# Patient Record
Sex: FEMALE | Race: BLACK OR AFRICAN AMERICAN | NOT HISPANIC OR LATINO | Employment: PART TIME | URBAN - METROPOLITAN AREA
[De-identification: names, ages, dates, MRNs, and addresses within clinical notes are randomized per-mention and may not be internally consistent; named-entity substitution may affect disease eponyms.]

---
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
## 2023-10-19 ENCOUNTER — OFFICE VISIT (OUTPATIENT)
Dept: URGENT CARE | Facility: CLINIC | Age: 18
End: 2023-10-19
Payer: COMMERCIAL

## 2023-10-19 VITALS
SYSTOLIC BLOOD PRESSURE: 113 MMHG | RESPIRATION RATE: 18 BRPM | DIASTOLIC BLOOD PRESSURE: 68 MMHG | HEIGHT: 67 IN | BODY MASS INDEX: 23.54 KG/M2 | OXYGEN SATURATION: 96 % | TEMPERATURE: 98 F | HEART RATE: 99 BPM | WEIGHT: 150 LBS

## 2023-10-19 DIAGNOSIS — R51.9 NONINTRACTABLE HEADACHE, UNSPECIFIED CHRONICITY PATTERN, UNSPECIFIED HEADACHE TYPE: Primary | ICD-10-CM

## 2023-10-19 DIAGNOSIS — R11.2 NAUSEA AND VOMITING, UNSPECIFIED VOMITING TYPE: ICD-10-CM

## 2023-10-19 DIAGNOSIS — R82.4 KETONURIA: ICD-10-CM

## 2023-10-19 LAB
B-HCG UR QL: NEGATIVE
BILIRUB UR QL STRIP: NEGATIVE
CTP QC/QA: YES
CTP QC/QA: YES
GLUCOSE UR QL STRIP: NEGATIVE
KETONES UR QL STRIP: POSITIVE
LEUKOCYTE ESTERASE UR QL STRIP: NEGATIVE
MOLECULAR STREP A: NEGATIVE
PH, POC UA: 5
POC BLOOD, URINE: NEGATIVE
POC NITRATES, URINE: NEGATIVE
PROT UR QL STRIP: NEGATIVE
SP GR UR STRIP: 1.02 (ref 1–1.03)
UROBILINOGEN UR STRIP-ACNC: 1 (ref 0.1–1.1)

## 2023-10-19 PROCEDURE — 81025 POCT URINE PREGNANCY: ICD-10-PCS | Mod: S$GLB,,, | Performed by: PHYSICIAN ASSISTANT

## 2023-10-19 PROCEDURE — 87651 POCT STREP A MOLECULAR: ICD-10-PCS | Mod: QW,S$GLB,, | Performed by: PHYSICIAN ASSISTANT

## 2023-10-19 PROCEDURE — 96372 THER/PROPH/DIAG INJ SC/IM: CPT | Mod: S$GLB,,, | Performed by: PHYSICIAN ASSISTANT

## 2023-10-19 PROCEDURE — 99214 PR OFFICE/OUTPT VISIT, EST, LEVL IV, 30-39 MIN: ICD-10-PCS | Mod: 25,S$GLB,, | Performed by: PHYSICIAN ASSISTANT

## 2023-10-19 PROCEDURE — 96372 PR INJECTION,THERAP/PROPH/DIAG2ST, IM OR SUBCUT: ICD-10-PCS | Mod: S$GLB,,, | Performed by: PHYSICIAN ASSISTANT

## 2023-10-19 PROCEDURE — 81003 POCT URINALYSIS, DIPSTICK, AUTOMATED, W/O SCOPE: ICD-10-PCS | Mod: QW,S$GLB,, | Performed by: PHYSICIAN ASSISTANT

## 2023-10-19 PROCEDURE — 81003 URINALYSIS AUTO W/O SCOPE: CPT | Mod: QW,S$GLB,, | Performed by: PHYSICIAN ASSISTANT

## 2023-10-19 PROCEDURE — 99214 OFFICE O/P EST MOD 30 MIN: CPT | Mod: 25,S$GLB,, | Performed by: PHYSICIAN ASSISTANT

## 2023-10-19 PROCEDURE — 87651 STREP A DNA AMP PROBE: CPT | Mod: QW,S$GLB,, | Performed by: PHYSICIAN ASSISTANT

## 2023-10-19 PROCEDURE — 81025 URINE PREGNANCY TEST: CPT | Mod: S$GLB,,, | Performed by: PHYSICIAN ASSISTANT

## 2023-10-19 RX ORDER — SODIUM CHLORIDE 9 MG/ML
INJECTION, SOLUTION INTRAVENOUS
Status: COMPLETED | OUTPATIENT
Start: 2023-10-19 | End: 2023-10-19

## 2023-10-19 RX ORDER — KETOROLAC TROMETHAMINE 30 MG/ML
30 INJECTION, SOLUTION INTRAMUSCULAR; INTRAVENOUS
Status: COMPLETED | OUTPATIENT
Start: 2023-10-19 | End: 2023-10-19

## 2023-10-19 RX ADMIN — KETOROLAC TROMETHAMINE 30 MG: 30 INJECTION, SOLUTION INTRAMUSCULAR; INTRAVENOUS at 01:10

## 2023-10-19 RX ADMIN — SODIUM CHLORIDE: 9 INJECTION, SOLUTION INTRAVENOUS at 02:10

## 2023-10-19 NOTE — PATIENT INSTRUCTIONS
Drink plenty of clear fluids (water, Powerade, Gatorade) to stay well hydrated. Dehydration can be the cause for your headache today that you have been experiencing.     If not allergic,take tylenol (acetominophen) for fever control, chills, or body aches every 4 hours. Do not exceed 4000 mg/ day.If not allergic, take Motrin (Ibuprofen) every 4 hours for fever, chills, pain or inflammation. Do not exceed 2400 mg/day. You can alternate taking tylenol and motrin. You can start taking Motrin tomorrow since you were administered the Toradol injection in clinic today.      You must understand that you've received an Urgent Care treatment only and that you may be released before all your medical problems are known or treated. You, the patient, will arrange for follow up care as instructed.      Follow up with your PCP or specialty clinic as instructed in the next 2-3 days if not improved or as needed. You can call [phone] to schedule an appointment with appropriate provider.      If you condition worsens, we recommend that you receive another evaluation at the emergency room immediately or contact your primary medical clinic's after hours call service to discuss your concerns.      Please return here or go to the Emergency Department for any concerns or worsening condition.      If you were prescribed a narcotic or controlled substance, do not drive or operate heavy equipment or machinery while taking these medications.

## 2023-10-19 NOTE — LETTER
October 19, 2023      [facility]  [address], [address]  [place] [address]  Phone: [phone]  Fax: [phone]       Patient: Brenda Menendez   YOB: 2005  Date of Visit: 10/19/2023    To Whom It May Concern:    Joanna Menendez  was at [facility] on 10/19/2023. She may return to work/school on 10/20/2023 with no restrictions. If you have any questions or concerns, or if I can be of further assistance, please do not hesitate to contact me.    Sincerely,    Kayla Castillo PA-C

## 2023-10-19 NOTE — PROGRESS NOTES
"Subjective:      Patient ID: Brenda Menendez is a 17 y.o. female.    Vitals:  height is 5' 7" (1.702 m) and weight is 68 kg (150 lb). Her oral temperature is 98.3 °F (36.8 °C). Her blood pressure is 113/68 and her pulse is 99. Her respiration is 18 and oxygen saturation is 96%.     Chief Complaint: Headache    Pt states that she started with a headache, running nose, nausea. States that this headache is not similar to previous headaches. Pt states that she was feeling nauseous and vomited this morning. States that she took a covid test on Tuesday but it was negative.     Patient provider note starts here:  Patient presents with a few days history of a frontal headache described as pressure. Also reports watery and itchy eyes. Has lower abdominal cramping (similar to menstrual cramps) and nausea. Had 2 episodes of nonbilious, nonbloody emesis this morning. Has been taking cold and flu medication and allergy medication OTC without significant relief so she is unsure for the cause of her symptoms. She is sexually active. LMP 9/30/2023. She reports some dizziness with standing too quickly over the past few days.     Headache   This is a new problem. The current episode started in the past 7 days. The problem occurs constantly. The problem has been unchanged. The pain is located in the Bilateral region. The pain radiates to the face. The pain quality is not similar to prior headaches. The quality of the pain is described as stabbing and aching. The pain is at a severity of 5/10. Associated symptoms include abdominal pain (lower pelvic cramping), eye redness, nausea, rhinorrhea, sinus pressure (frontal sinus) and vomiting (x2 this morning). Pertinent negatives include no coughing, fever, neck pain, numbness or photophobia. Nothing aggravates the symptoms. Treatments tried: aleve, mucinex, allergy medication, eye drops, cough drops. The treatment provided no relief.       Constitution: Negative for fever.   HENT:  Positive " for sinus pressure (frontal sinus).    Neck: Negative for neck pain.   Cardiovascular:  Negative for chest pain, palpitations and sob on exertion.   Eyes:  Positive for eye redness. Negative for photophobia.   Respiratory:  Negative for chest tightness, cough, sputum production and wheezing.    Gastrointestinal:  Positive for abdominal pain (lower pelvic cramping), nausea and vomiting (x2 this morning). Negative for diarrhea.   Skin:  Negative for color change and wound.   Neurological:  Positive for headaches. Negative for numbness and tingling.      Objective:     Physical Exam   Constitutional: She is oriented to person, place, and time. She appears well-developed. She is cooperative.  Non-toxic appearance. She does not appear ill. No distress.   HENT:   Head: Normocephalic and atraumatic.   Ears:   Right Ear: Hearing, tympanic membrane, external ear and ear canal normal.   Left Ear: Hearing, tympanic membrane, external ear and ear canal normal.   Nose: Nose normal. No mucosal edema, rhinorrhea, nasal deformity or congestion. No epistaxis. Right sinus exhibits no maxillary sinus tenderness and no frontal sinus tenderness. Left sinus exhibits no maxillary sinus tenderness and no frontal sinus tenderness.   Mouth/Throat: Uvula is midline and mucous membranes are normal. No trismus in the jaw. Normal dentition. No uvula swelling. Posterior oropharyngeal erythema present. No oropharyngeal exudate or posterior oropharyngeal edema.   Eyes: Conjunctivae and lids are normal. No scleral icterus.   Neck: Trachea normal and phonation normal. Neck supple. No edema present. No erythema present. No neck rigidity present.   Cardiovascular: Normal rate, regular rhythm, normal heart sounds and normal pulses.   Pulmonary/Chest: Effort normal and breath sounds normal. No respiratory distress. She has no decreased breath sounds. She has no wheezes. She has no rhonchi.   Abdominal: Normal appearance. Soft. There is no abdominal  tenderness. There is no rebound, no guarding, no left CVA tenderness and no right CVA tenderness.   Musculoskeletal: Normal range of motion.         General: No deformity. Normal range of motion.   Lymphadenopathy:     She has cervical adenopathy.   Neurological: no focal deficit. She is alert and oriented to person, place, and time. She has normal motor skills, normal sensation and intact cranial nerves (2-12). She displays facial symmetry. She exhibits normal muscle tone. She has a normal Finger-Nose-Finger Test. Coordination: Romberg sign negative. Gait and coordination normal. Coordination normal. GCS eye subscore is 4. GCS verbal subscore is 5. GCS motor subscore is 6.   Skin: Skin is warm, dry, intact, not diaphoretic and not pale.   Psychiatric: Her speech is normal and behavior is normal. Judgment and thought content normal.   Nursing note and vitals reviewed.      Assessment:     1. Nonintractable headache, unspecified chronicity pattern, unspecified headache type    2. Nausea and vomiting, unspecified vomiting type    3. Ketonuria      Results for orders placed or performed in visit on 10/19/23   POCT urine pregnancy   Result Value Ref Range    POC Preg Test, Ur Negative Negative     Acceptable Yes    POCT Urinalysis, Dipstick, Automated, W/O Scope   Result Value Ref Range    POC Blood, Urine Negative Negative    POC Bilirubin, Urine Negative Negative    POC Urobilinogen, Urine 1.0 0.1 - 1.1    POC Ketones, Urine Positive (A) Negative    POC Protein, Urine Negative Negative    POC Nitrates, Urine Negative Negative    POC Glucose, Urine Negative Negative    pH, UA 5.0     POC Specific Gravity, Urine 1.025 1.003 - 1.029    POC Leukocytes, Urine Negative Negative   POCT Strep A, Molecular   Result Value Ref Range    Molecular Strep A, POC Negative Negative     Acceptable Yes        Plan:       Nonintractable headache, unspecified chronicity pattern, unspecified headache type  -      ketorolac injection 30 mg  -     0.9%  NaCl infusion    Nausea and vomiting, unspecified vomiting type  -     POCT urine pregnancy  -     POCT Urinalysis, Dipstick, Automated, W/O Scope  -     POCT Strep A, Molecular  -     0.9%  NaCl infusion    Ketonuria  -     0.9%  NaCl infusion          Medical Decision Making:   History:   Old Medical Records: I decided to obtain old medical records.  Clinical Tests:   Lab Tests: Ordered and Reviewed  Urgent Care Management:  A. Problem List:   -Acute: Headache, nausea and vomiting    -Chronic: anxiety and depression  B. Differential diagnosis: Migraine headache, cluster headache, tension headache eye strain, and infectious causes such as meningitis, pharyngitis and sinusitis, other dangerous causes such as subarachnoid hemorrhage, tumor, UTI, pregnancy, menstrual cramping, COVID, Flu, strep pharyngitis   C. Diagnostic Testing Ordered: UA, UPT, Strep  D. Diagnostic Testing Considered: None  E. Independent Historians: None  F. Urgent Care Midlevel Independent Results Interpretation: UPT negative, UA only has ketones, Strep negative  G. Radiology:  H. Review of Previous Medical Records: Patient treated for acute cystitis in September.   I. Home Medications Reviewed  J. Social Determinants of Health considered  K. Medical Decision Making and Disposition: Patient presents with complaints of headache, abdominal cramps and sinus pressure. On exam, she is afebrile and nontoxic appearing. She has a normal neuro exam and there is no significant abdominal tenderness or CVA tenderness on palpation. Her UA shows ketones but no evidence of infection. Dehydration could be the cause for her headache in addition to viral syndrome. She was administered Toradol in clinic for the headache and abdominal cramping which she described as menstrual cramps. Patient did report dizziness upon standing while orthostatic vitals were being taken. Due to this, reported headache and ketonuria, I offered  IVF which patient did agree to. I feel that her symptoms are related to dehydration. She reported symptomatic improvement in her symptoms. Strongly advised to continue hydrating with PO fluids and close follow-up with PCP. ED precautions discussed. She verbalized understanding and agreed with plan.        Patient Instructions   Drink plenty of clear fluids (water, Powerade, Gatorade) to stay well hydrated. Dehydration can be the cause for your headache today that you have been experiencing.     If not allergic,take tylenol (acetominophen) for fever control, chills, or body aches every 4 hours. Do not exceed 4000 mg/ day.If not allergic, take Motrin (Ibuprofen) every 4 hours for fever, chills, pain or inflammation. Do not exceed 2400 mg/day. You can alternate taking tylenol and motrin. You can start taking Motrin tomorrow since you were administered the Toradol injection in clinic today.      You must understand that you've received an Urgent Care treatment only and that you may be released before all your medical problems are known or treated. You, the patient, will arrange for follow up care as instructed.      Follow up with your PCP or specialty clinic as instructed in the next 2-3 days if not improved or as needed. You can call [phone] to schedule an appointment with appropriate provider.      If you condition worsens, we recommend that you receive another evaluation at the emergency room immediately or contact your primary medical clinic's after hours call service to discuss your concerns.      Please return here or go to the Emergency Department for any concerns or worsening condition.      If you were prescribed a narcotic or controlled substance, do not drive or operate heavy equipment or machinery while taking these medications.

## 2023-11-01 ENCOUNTER — TELEPHONE (OUTPATIENT)
Dept: OBSTETRICS AND GYNECOLOGY | Facility: CLINIC | Age: 18
End: 2023-11-01
Payer: OTHER GOVERNMENT

## 2023-11-28 ENCOUNTER — PROCEDURE VISIT (OUTPATIENT)
Dept: OBSTETRICS AND GYNECOLOGY | Facility: CLINIC | Age: 18
End: 2023-11-28
Payer: COMMERCIAL

## 2023-11-28 ENCOUNTER — LAB VISIT (OUTPATIENT)
Dept: LAB | Facility: HOSPITAL | Age: 18
End: 2023-11-28
Payer: OTHER GOVERNMENT

## 2023-11-28 ENCOUNTER — TELEPHONE (OUTPATIENT)
Dept: OBSTETRICS AND GYNECOLOGY | Facility: CLINIC | Age: 18
End: 2023-11-28

## 2023-11-28 ENCOUNTER — OFFICE VISIT (OUTPATIENT)
Dept: OBSTETRICS AND GYNECOLOGY | Facility: CLINIC | Age: 18
End: 2023-11-28
Payer: COMMERCIAL

## 2023-11-28 VITALS
HEART RATE: 72 BPM | HEIGHT: 67 IN | BODY MASS INDEX: 24.05 KG/M2 | DIASTOLIC BLOOD PRESSURE: 74 MMHG | SYSTOLIC BLOOD PRESSURE: 128 MMHG | WEIGHT: 153.25 LBS

## 2023-11-28 DIAGNOSIS — Z34.90 PREGNANCY, UNSPECIFIED GESTATIONAL AGE: ICD-10-CM

## 2023-11-28 DIAGNOSIS — Z34.90 PREGNANCY, UNSPECIFIED GESTATIONAL AGE: Primary | ICD-10-CM

## 2023-11-28 DIAGNOSIS — N92.6 MISSED MENSES: Primary | ICD-10-CM

## 2023-11-28 LAB
ABO + RH BLD: NORMAL
ANION GAP SERPL CALC-SCNC: 12 MMOL/L (ref 8–16)
BASOPHILS # BLD AUTO: 0.03 K/UL (ref 0–0.2)
BASOPHILS NFR BLD: 0.4 % (ref 0–1.9)
BLD GP AB SCN CELLS X3 SERPL QL: NORMAL
BUN SERPL-MCNC: 10 MG/DL (ref 6–20)
CALCIUM SERPL-MCNC: 9.8 MG/DL (ref 8.7–10.5)
CHLORIDE SERPL-SCNC: 104 MMOL/L (ref 95–110)
CO2 SERPL-SCNC: 19 MMOL/L (ref 23–29)
CREAT SERPL-MCNC: 0.6 MG/DL (ref 0.5–1.4)
DIFFERENTIAL METHOD: NORMAL
EOSINOPHIL # BLD AUTO: 0.1 K/UL (ref 0–0.5)
EOSINOPHIL NFR BLD: 1.3 % (ref 0–8)
ERYTHROCYTE [DISTWIDTH] IN BLOOD BY AUTOMATED COUNT: 13 % (ref 11.5–14.5)
EST. GFR  (NO RACE VARIABLE): >60 ML/MIN/1.73 M^2
GLUCOSE SERPL-MCNC: 76 MG/DL (ref 70–110)
HBV SURFACE AG SERPL QL IA: NORMAL
HCT VFR BLD AUTO: 42 % (ref 37–48.5)
HCV AB SERPL QL IA: NORMAL
HGB BLD-MCNC: 14 G/DL (ref 12–16)
HIV 1+2 AB+HIV1 P24 AG SERPL QL IA: NORMAL
IMM GRANULOCYTES # BLD AUTO: 0.02 K/UL (ref 0–0.04)
IMM GRANULOCYTES NFR BLD AUTO: 0.3 % (ref 0–0.5)
LYMPHOCYTES # BLD AUTO: 2.6 K/UL (ref 1–4.8)
LYMPHOCYTES NFR BLD: 38 % (ref 18–48)
MCH RBC QN AUTO: 30 PG (ref 27–31)
MCHC RBC AUTO-ENTMCNC: 33.3 G/DL (ref 32–36)
MCV RBC AUTO: 90 FL (ref 82–98)
MONOCYTES # BLD AUTO: 0.6 K/UL (ref 0.3–1)
MONOCYTES NFR BLD: 9.2 % (ref 4–15)
NEUTROPHILS # BLD AUTO: 3.5 K/UL (ref 1.8–7.7)
NEUTROPHILS NFR BLD: 50.8 % (ref 38–73)
NRBC BLD-RTO: 0 /100 WBC
PLATELET # BLD AUTO: 280 K/UL (ref 150–450)
PMV BLD AUTO: 11.7 FL (ref 9.2–12.9)
POTASSIUM SERPL-SCNC: 3.8 MMOL/L (ref 3.5–5.1)
RBC # BLD AUTO: 4.66 M/UL (ref 4–5.4)
SODIUM SERPL-SCNC: 135 MMOL/L (ref 136–145)
SPECIMEN OUTDATE: NORMAL
TSH SERPL DL<=0.005 MIU/L-ACNC: 2.35 UIU/ML (ref 0.4–4)
WBC # BLD AUTO: 6.93 K/UL (ref 3.9–12.7)

## 2023-11-28 PROCEDURE — 99214 PR OFFICE/OUTPT VISIT, EST, LEVL IV, 30-39 MIN: ICD-10-PCS | Mod: S$PBB,,,

## 2023-11-28 PROCEDURE — 86762 RUBELLA ANTIBODY: CPT

## 2023-11-28 PROCEDURE — 84443 ASSAY THYROID STIM HORMONE: CPT

## 2023-11-28 PROCEDURE — 87086 URINE CULTURE/COLONY COUNT: CPT

## 2023-11-28 PROCEDURE — 76801 US OB/GYN EXTENDED PROCEDURE (VIEWPOINT): ICD-10-PCS | Mod: 26,S$PBB,, | Performed by: OBSTETRICS & GYNECOLOGY

## 2023-11-28 PROCEDURE — 87340 HEPATITIS B SURFACE AG IA: CPT

## 2023-11-28 PROCEDURE — 87389 HIV-1 AG W/HIV-1&-2 AB AG IA: CPT

## 2023-11-28 PROCEDURE — 76801 OB US < 14 WKS SINGLE FETUS: CPT | Mod: PBBFAC | Performed by: OBSTETRICS & GYNECOLOGY

## 2023-11-28 PROCEDURE — 86900 BLOOD TYPING SEROLOGIC ABO: CPT

## 2023-11-28 PROCEDURE — 99213 OFFICE O/P EST LOW 20 MIN: CPT | Mod: PBBFAC,25

## 2023-11-28 PROCEDURE — 99999 PR PBB SHADOW E&M-EST. PATIENT-LVL III: CPT | Mod: PBBFAC,,,

## 2023-11-28 PROCEDURE — 99999 PR PBB SHADOW E&M-EST. PATIENT-LVL III: ICD-10-PCS | Mod: PBBFAC,,,

## 2023-11-28 PROCEDURE — 87491 CHLMYD TRACH DNA AMP PROBE: CPT

## 2023-11-28 PROCEDURE — 99214 OFFICE O/P EST MOD 30 MIN: CPT | Mod: S$PBB,,,

## 2023-11-28 PROCEDURE — 86803 HEPATITIS C AB TEST: CPT

## 2023-11-28 PROCEDURE — 87591 N.GONORRHOEAE DNA AMP PROB: CPT

## 2023-11-28 PROCEDURE — 86592 SYPHILIS TEST NON-TREP QUAL: CPT

## 2023-11-28 PROCEDURE — 85025 COMPLETE CBC W/AUTO DIFF WBC: CPT

## 2023-11-28 PROCEDURE — 80048 BASIC METABOLIC PNL TOTAL CA: CPT

## 2023-11-28 NOTE — PROGRESS NOTES
Chief Complaint: Absence of Menses     HPI:      (New patient)    Brenda Menendez is a 17 y.o. female, ,  Presents today for a routine exam complaining of amenorrhea and positive home urine pregnancy test.  Patient's last menstrual period was 2023 (exact date).  Pt reports menses were normal and regular prior to this.  She is not currently on any contraception. Currently taking PNV. Reports mild nausea. Reports breast tenderness. Denies pelvic pain, bleeding, or abnormal discharge.    No reported medical history for patient or FOB. No reported personal/familial history of genetic or chromosomal issues for patient or FOB. No reported abdominal surgeries.     LMP: Patient's last menstrual period was 2023 (exact date).  EGA: 8w + 3d (per LMP)  KATY: 2024 (per LMP)    UPT is: positive.     Last Pap:  N/A    MEDICATIONS AND ALLERGIES:  Reviewed    COMPREHENSIVE GYN HISTORY:  PAP History: Denies abnormal Paps.  Infection History: Denies STDs. Denies PID.  Benign History: Denies uterine fibroids. Denies ovarian cysts. Denies endometriosis. Denies other conditions.  Cancer History: Denies cervical cancer. Denies uterine cancer or hyperplasia. Denies ovarian cancer. Denies vulvar cancer or pre-cancer. Denies vaginal cancer or pre-cancer. Denies breast cancer. Denies colon cancer.  Sexual Activity History: Reports currently being sexually active  Menstrual History: None.  Contraception: None    Past Medical History:   Diagnosis Date    Pneumonia     Varicella      History reviewed. No pertinent surgical history.  Social History     Tobacco Use    Smoking status: Never     Passive exposure: Yes    Smokeless tobacco: Never    Tobacco comments:     dad smokes outside   Substance Use Topics    Alcohol use: No    Drug use: No     Family History   Problem Relation Age of Onset    Breast cancer Maternal Grandmother     Breast cancer Paternal Grandmother     Colon cancer Neg Hx     Ovarian cancer Neg Hx      OB  "History    Para Term  AB Living   1             SAB IAB Ectopic Multiple Live Births                  # Outcome Date GA Lbr Jones/2nd Weight Sex Delivery Anes PTL Lv   1 Current                ROS:     GENERAL: No weight changes. No swelling. No fatigue. No fever.  CARDIOVASCULAR: No chest pain. No shortness of breath. No leg cramps.   NEUROLOGICAL: No headaches. No vision changes.  BREASTS: No pain. No lumps. No discharge.  ABDOMEN: No pain. No diarrhea. No constipation.  REPRODUCTIVE: No abnormal bleeding.   VULVA: No pain. No lesions. No itching.  VAGINA: No relaxation. No itching. No odor. No discharge. No lesions.  URINARY: No incontinence. No nocturia. No frequency. No dysuria.    Physical Exam:     /74   Pulse 72   Ht 5' 7" (1.702 m)   Wt 69.5 kg (153 lb 3.5 oz)   LMP 2023 (Exact Date)   BMI 24.00 kg/m²   Body mass index is 24 kg/m².     PE:  AFFECT: Calm, alert and oriented X 3. Interactive during exam  GENERAL: Appears well-nourished, well-developed, in no acute distress.  HEAD: Normocephalic, atruamatic  TEETH: Good dentition.  THYROID: No thyromegally   SKIN: Normal for race, warm, & dry. No lesions or rashes.  LUNGS: Easy and unlabored  HEART: Regular rate and rhythm     Assessment/Plan:   PROCEDURES:  UPT - Positive  Dalila Gutierrez was seen today for possible pregnancy.    Diagnoses and all orders for this visit:    Missed menses  -     POCT urine pregnancy    Pregnancy, unspecified gestational age  -     Hepatitis C Antibody; Future  -     HIV-1 and HIV-2 antibodies; Future  -     RPR; Future  -     Hepatitis B surface antigen; Future  -     Type & Screen; Future  -     Rubella antibody, IgG; Future  -     Urine culture  -     CBC auto differential; Future  -     Basic metabolic panel; Future  -     TSH; Future  -     C. trachomatis/N. gonorrhoeae by AMP DNA  -     US OB/GYN Procedure ([facility]) - Extended List - Future; Future    Nausea and vomiting in pregnancy    - "  Education regarding lifestyle and dietary modifications    -  Advised use of B6/Unisom. Pt will notify us if no relief/worsening symptoms, will consider Zofran if needed.  (To help with nausea and vomiting, 25mg of Vitamin B6 taken 3-4 times a day along with 12.5 mg of Unisom taken 3-4 times a day helps. Unisom only comes in 25mg tabs, so you will have to cut those in half. These are the same ingredients that are in the prescription versions!  Anything spike will help, along with small frequent meals instead of larger less frequent meals help. Stay hydrated.)    Counselinst TRIMESTER COUNSELING: Discussed all, booklet provided:  Common complaints of pregnancy  HIV and other routine prenatal tests including  genetic screening  Risk factors identified by prenatal history  Oriented to practice - discussed anticipated course of prenatal care & indications for Ultrasound  Childbirth classes/Hospital facilities   Nutrition and weight gain counseling  Dietary recommendations  Toxoplasmosis precautions (Cats/Raw Meat)  Sexual activity and exercise  Environmental/Work hazards  Travel  Tobacco (Ask, Advise, Assess, Assist, and Arrange), as well as alcohol and drug use  Use of any medications (Including supplements, Vitamins, Herbs, or OTC Drugs)  Domestic violence  Seat belt use  COVID-19 virus precautions for both patient and her spouse discussed, and available data on COVID vaccination reviewed.  Encouraged compliance with prenatal vitamins.  Safety of exercise discussed with patient, and continued active lifestyle encouraged.  Medications safe in pregnancy discussed and list provided.    TERATOLOGY COUNSELING:   Discussed indications and options for aneuploidy screening - pamphlets given    -  Pt declines testing    Initial OB labs and Dating US today.  FOLLOW-UP in 4 weeks for initial OB visit pending results of US.    Kadi Polk (Maggie), ANJELICA  Obstetrics and Gynecology  [facility]  - [facility]     ~25 minutes spent with pt Face to Face with >50% of visit spent on education/counseling.

## 2023-11-28 NOTE — TELEPHONE ENCOUNTER
Contact made with patient. Confirmed first day of last menstrual period was 09/30/2023.  Pt reports periods are monthly and regular.  Based on LMP, GA 8w +3d.  Discussed US results showing gestational sac present measuring 6w + 3 d without yolk sac, amniotic sac, or embryo.  Reassurance given but discussed concerns that this pregnancy may not be progressing as expected.  Recommendation for repeat ultrasound in 2 weeks.  Discussed bleeding precautions in the meantime.  Pt verbalized understanding of information.  Will get patient scheduled for f/u.

## 2023-11-29 ENCOUNTER — PATIENT MESSAGE (OUTPATIENT)
Dept: OBSTETRICS AND GYNECOLOGY | Facility: CLINIC | Age: 18
End: 2023-11-29
Payer: OTHER GOVERNMENT

## 2023-11-29 LAB
RPR SER QL: NORMAL
RUBV IGG SER-ACNC: 77.7 IU/ML
RUBV IGG SER-IMP: REACTIVE

## 2023-11-29 NOTE — LETTER
December 7, 2023    Brenda Menendez  [address] [place] [address]              [facility]  [address]  [place] [address]  Phone: [phone]      To Whom It May Concern:    Ms. Menendez is currently under our care for pregnancy.  Estimated Date of Delivery: 07/06/2024     Any elective dental work should preferably be scheduled during or after the mid-trimester or postpartum.    Dental procedures can be performed with local anesthesia without epinephrine. Recommended antibiotics include penicillins, erythromycin, and cephalosporins. Tylenol #3 or Percocet may be used for pain control. No x-rays are allowed for routine screening. For dental problems, up to four x-rays may be taken with proper abdominal shield.    Sincerely,    Sandra Parker RN

## 2023-11-30 LAB
BACTERIA UR CULT: NORMAL
C TRACH DNA SPEC QL NAA+PROBE: NOT DETECTED
N GONORRHOEA DNA SPEC QL NAA+PROBE: NOT DETECTED

## 2023-11-30 NOTE — TELEPHONE ENCOUNTER
Contact made with patient. Discussed initial lab work. Reviewed ultrasound findings again and recommendations.  Discussed getting HCG and progesterone levels, pt will hold off for now. All questions answered.  Pt verbalized understanding.

## 2023-12-12 ENCOUNTER — ROUTINE PRENATAL (OUTPATIENT)
Dept: OBSTETRICS AND GYNECOLOGY | Facility: CLINIC | Age: 18
End: 2023-12-12
Payer: OTHER GOVERNMENT

## 2023-12-12 ENCOUNTER — LAB VISIT (OUTPATIENT)
Dept: LAB | Facility: HOSPITAL | Age: 18
End: 2023-12-12
Payer: OTHER GOVERNMENT

## 2023-12-12 DIAGNOSIS — O02.1 MISSED AB: Primary | ICD-10-CM

## 2023-12-12 DIAGNOSIS — O02.1 MISSED AB: ICD-10-CM

## 2023-12-12 DIAGNOSIS — Z34.90 PREGNANCY, UNSPECIFIED GESTATIONAL AGE: ICD-10-CM

## 2023-12-12 LAB
BASOPHILS # BLD AUTO: 0.04 K/UL (ref 0–0.2)
BASOPHILS NFR BLD: 0.6 % (ref 0–1.9)
DIFFERENTIAL METHOD: NORMAL
EOSINOPHIL # BLD AUTO: 0 K/UL (ref 0–0.5)
EOSINOPHIL NFR BLD: 0.6 % (ref 0–8)
ERYTHROCYTE [DISTWIDTH] IN BLOOD BY AUTOMATED COUNT: 13.6 % (ref 11.5–14.5)
HCG INTACT+B SERPL-ACNC: NORMAL MIU/ML
HCT VFR BLD AUTO: 37.9 % (ref 37–48.5)
HGB BLD-MCNC: 12.6 G/DL (ref 12–16)
IMM GRANULOCYTES # BLD AUTO: 0.01 K/UL (ref 0–0.04)
IMM GRANULOCYTES NFR BLD AUTO: 0.1 % (ref 0–0.5)
LYMPHOCYTES # BLD AUTO: 2.4 K/UL (ref 1–4.8)
LYMPHOCYTES NFR BLD: 33.4 % (ref 18–48)
MCH RBC QN AUTO: 30 PG (ref 27–31)
MCHC RBC AUTO-ENTMCNC: 33.2 G/DL (ref 32–36)
MCV RBC AUTO: 90 FL (ref 82–98)
MONOCYTES # BLD AUTO: 0.7 K/UL (ref 0.3–1)
MONOCYTES NFR BLD: 9.9 % (ref 4–15)
NEUTROPHILS # BLD AUTO: 4 K/UL (ref 1.8–7.7)
NEUTROPHILS NFR BLD: 55.4 % (ref 38–73)
NRBC BLD-RTO: 0 /100 WBC
PLATELET # BLD AUTO: 265 K/UL (ref 150–450)
PMV BLD AUTO: 11.9 FL (ref 9.2–12.9)
RBC # BLD AUTO: 4.2 M/UL (ref 4–5.4)
WBC # BLD AUTO: 7.16 K/UL (ref 3.9–12.7)

## 2023-12-12 PROCEDURE — 84702 CHORIONIC GONADOTROPIN TEST: CPT

## 2023-12-12 PROCEDURE — 99214 OFFICE O/P EST MOD 30 MIN: CPT | Mod: S$PBB,,,

## 2023-12-12 PROCEDURE — 99212 OFFICE O/P EST SF 10 MIN: CPT | Mod: PBBFAC

## 2023-12-12 PROCEDURE — 76817 US OB/GYN EXTENDED PROCEDURE (VIEWPOINT): ICD-10-PCS | Mod: 26,S$PBB,, | Performed by: OBSTETRICS & GYNECOLOGY

## 2023-12-12 PROCEDURE — 99214 PR OFFICE/OUTPT VISIT, EST, LEVL IV, 30-39 MIN: ICD-10-PCS | Mod: S$PBB,,,

## 2023-12-12 PROCEDURE — 76817 TRANSVAGINAL US OBSTETRIC: CPT | Mod: PBBFAC | Performed by: OBSTETRICS & GYNECOLOGY

## 2023-12-12 PROCEDURE — 99999 PR PBB SHADOW E&M-EST. PATIENT-LVL II: ICD-10-PCS | Mod: PBBFAC,,,

## 2023-12-12 PROCEDURE — 85025 COMPLETE CBC W/AUTO DIFF WBC: CPT

## 2023-12-12 PROCEDURE — 99999 PR PBB SHADOW E&M-EST. PATIENT-LVL II: CPT | Mod: PBBFAC,,,

## 2023-12-12 PROCEDURE — 36415 COLL VENOUS BLD VENIPUNCTURE: CPT

## 2023-12-12 NOTE — PROGRESS NOTES
Chief Complaint: F/u pregnancy viability     HPI:      Brenda Menendez is a 17 y.o.  who presents today for follow up ultrasound and visit to assess viability in pregnancy.  Seen on  with missed menses and positive pregnancy test. EGA based on LMP (2023):  8w + 3d.  Dating US performed showing intrauterine gestational sac with mean sac diameter of 15.1 mm w/o yolk sac or embryo. Tentative diagnosis pregnancy of uncertain viability. HCG and progesterone testing discussed and pt opted to hold off.  Recommendation for further imaging no sooner than 2 weeks from date of US.  Today pt reports small amount of pink tinged spotting yesterday.  Denies bleeding. Denies pain or cramping.    Physical Exam:      PHYSICAL EXAM:  LMP 2023 (Exact Date)   There is no height or weight on file to calculate BMI.     APPEARANCE: Well nourished, well developed, in no acute distress.  PELVIC:  deferred    Results:     Indication   ========   Indication: Encounter for Fetal Viability     History   ======   Previous Outcomes    1   Para 0     Method   ======   Transvaginal ultrasound examination, 2D Color Doppler, [place] S8. View: Good view     Pregnancy   =========   Number of fetuses: none     Dating   ======   LMP on: 2023   GA by LMP 10 w + 3 d   KATY by LMP: 2024     Assessment   ==========   Gestational sac: visualized   GS 16.0 mm 6w 3d Rempen   Location: intrauterine   GS D1 19.5 mm   GS D2 10.8 mm   GS D3 17.7 mm   Yolk sac: not visualized   Amniotic sac: not visualized   Embryo: not visualized     Maternal Structures   ===============   Uterus / Cervix   Uterus: Normal   Ovaries / Tubes / Adnexa   Rt ovary: Normal   Rt ovary D1 31 mm   Rt ovary D2 33 mm   Rt ovary D3 18 mm   Rt ovary Vol 9.7 cmÂ³   Rt ovarian corpus luteum: hemorrhagic   Rt ovarian corpus luteum D1 19.0 mm   Rt ovarian corpus luteum D2 15.0 mm   Rt ovarian corpus luteum D3 18.0 mm   Lt ovary: Normal   Lt ovary D1 25 mm   Lt ovary  D2 14 mm   Lt ovary D3 27 mm   Lt ovary Vol 4.9 cmÂ³   Cul de Sac / Bladder / Kidneys / Other   Free fluid: No free fluid visualized     Impression   =========   14+ days have elapsed since the last exam.   On today's exam, there is no evidence of an embryo in the gestational sac.   Therefore, using time-based criteria for viability, today's findings are diagnostic for a nonviable pregnancy (anembryonic pregnancy).   Ms. Polk is aware of today's findings.     Recommendation   ==============   Repeat ultrasound study as clinically indicated per primary OB. No further ultrasounds have been scheduled by [facility].                                                       Assessment/Plan:     Missed ab  -    US diagnostic for nonviable pregnancy.  -    Discussed ultrasound findings with the patient; informed the patient that 1/5 women have miscarriages at some point in their lifetime and that it is not considered abnormal until a patient has three consecutive abortions; the most likely cause for miscarriage in the first trimester is chromosomal abnormalities.  -     Blood type: A+  -     CBC W/ AUTO DIFFERENTIAL; Future; Expected date: 12/12/2023  -     HCG, QUANTITATIVE, PREGNANCY; Future; Expected date: 12/12/2023  -     Discussed diagnosis with patient, and discussed management options to include expectant management, medical management with misoprostol, and surgical management with D&C.  Risk and benefits of management choices reviewed.  No indication for emergent surgical intervention at this time.  After review, pt would like to discuss options with SO and mother and will let me know what she would like to proceed with.  -      Bleeding and pain precautions given.  ED precautions for bleeding more than 3 pads/hr, fever, severe pain. She is aware that she may need surgical intervention and that this intervention may have to be done emergently.    CBC and HCG today.  Follow up in the next 48 hours to discuss further  management.    Kadi Polk (Maggie), ANJELICA  Obstetrics and Gynecology  [facility]

## 2023-12-14 ENCOUNTER — TELEPHONE (OUTPATIENT)
Dept: OBSTETRICS AND GYNECOLOGY | Facility: CLINIC | Age: 18
End: 2023-12-14
Payer: OTHER GOVERNMENT

## 2023-12-14 NOTE — TELEPHONE ENCOUNTER
Attempted to contact patient. No answer. Left VM message for patient to call back to discuss how she was doing and what her decision was moving forward.

## 2023-12-15 ENCOUNTER — PATIENT MESSAGE (OUTPATIENT)
Dept: OBSTETRICS AND GYNECOLOGY | Facility: CLINIC | Age: 18
End: 2023-12-15
Payer: OTHER GOVERNMENT

## 2023-12-17 ENCOUNTER — HOSPITAL ENCOUNTER (EMERGENCY)
Facility: HOSPITAL | Age: 18
Discharge: HOME | End: 2023-12-17
Attending: EMERGENCY MEDICINE
Payer: OTHER GOVERNMENT

## 2023-12-17 VITALS
SYSTOLIC BLOOD PRESSURE: 92 MMHG | HEIGHT: 68 IN | TEMPERATURE: 98 F | DIASTOLIC BLOOD PRESSURE: 50 MMHG | OXYGEN SATURATION: 99 % | HEART RATE: 70 BPM | WEIGHT: 150 LBS | RESPIRATION RATE: 20 BRPM | BODY MASS INDEX: 22.73 KG/M2

## 2023-12-17 DIAGNOSIS — O03.9 MISCARRIAGE: Primary | ICD-10-CM

## 2023-12-17 LAB
ABO + RH BLD: NORMAL
ALBUMIN SERPL BCP-MCNC: 3.8 G/DL (ref 3.5–5.2)
ALP SERPL-CCNC: 56 U/L (ref 55–135)
ALT SERPL W/O P-5'-P-CCNC: 14 U/L (ref 10–44)
ANION GAP SERPL CALC-SCNC: 10 MMOL/L (ref 8–16)
AST SERPL-CCNC: 15 U/L (ref 10–40)
BASOPHILS # BLD AUTO: 0.04 K/UL (ref 0–0.2)
BASOPHILS NFR BLD: 0.5 % (ref 0–1.9)
BILIRUB SERPL-MCNC: 0.4 MG/DL (ref 0.1–1)
BLD GP AB SCN CELLS X3 SERPL QL: NORMAL
BUN SERPL-MCNC: 5 MG/DL (ref 6–20)
CALCIUM SERPL-MCNC: 8.8 MG/DL (ref 8.7–10.5)
CHLORIDE SERPL-SCNC: 109 MMOL/L (ref 95–110)
CO2 SERPL-SCNC: 17 MMOL/L (ref 23–29)
CREAT SERPL-MCNC: 0.7 MG/DL (ref 0.5–1.4)
DIFFERENTIAL METHOD: NORMAL
EOSINOPHIL # BLD AUTO: 0.2 K/UL (ref 0–0.5)
EOSINOPHIL NFR BLD: 2 % (ref 0–8)
ERYTHROCYTE [DISTWIDTH] IN BLOOD BY AUTOMATED COUNT: 13.3 % (ref 11.5–14.5)
EST. GFR  (NO RACE VARIABLE): >60 ML/MIN/1.73 M^2
GLUCOSE SERPL-MCNC: 109 MG/DL (ref 70–110)
HCG INTACT+B SERPL-ACNC: 7358 MIU/ML
HCT VFR BLD AUTO: 37.7 % (ref 37–48.5)
HGB BLD-MCNC: 13.1 G/DL (ref 12–16)
IMM GRANULOCYTES # BLD AUTO: 0.03 K/UL (ref 0–0.04)
IMM GRANULOCYTES NFR BLD AUTO: 0.4 % (ref 0–0.5)
LYMPHOCYTES # BLD AUTO: 2.1 K/UL (ref 1–4.8)
LYMPHOCYTES NFR BLD: 27.2 % (ref 18–48)
MCH RBC QN AUTO: 30.4 PG (ref 27–31)
MCHC RBC AUTO-ENTMCNC: 34.7 G/DL (ref 32–36)
MCV RBC AUTO: 88 FL (ref 82–98)
MONOCYTES # BLD AUTO: 0.7 K/UL (ref 0.3–1)
MONOCYTES NFR BLD: 8.6 % (ref 4–15)
NEUTROPHILS # BLD AUTO: 4.7 K/UL (ref 1.8–7.7)
NEUTROPHILS NFR BLD: 61.3 % (ref 38–73)
NRBC BLD-RTO: 0 /100 WBC
PLATELET # BLD AUTO: 225 K/UL (ref 150–450)
PMV BLD AUTO: 10.9 FL (ref 9.2–12.9)
POTASSIUM SERPL-SCNC: 3.7 MMOL/L (ref 3.5–5.1)
PROT SERPL-MCNC: 7 G/DL (ref 6–8.4)
RBC # BLD AUTO: 4.31 M/UL (ref 4–5.4)
SODIUM SERPL-SCNC: 136 MMOL/L (ref 136–145)
SPECIMEN OUTDATE: NORMAL
WBC # BLD AUTO: 7.67 K/UL (ref 3.9–12.7)

## 2023-12-17 PROCEDURE — 85025 COMPLETE CBC W/AUTO DIFF WBC: CPT | Performed by: EMERGENCY MEDICINE

## 2023-12-17 PROCEDURE — 80053 COMPREHEN METABOLIC PANEL: CPT | Performed by: EMERGENCY MEDICINE

## 2023-12-17 PROCEDURE — 99284 EMERGENCY DEPT VISIT MOD MDM: CPT | Mod: 25

## 2023-12-17 PROCEDURE — 86901 BLOOD TYPING SEROLOGIC RH(D): CPT | Performed by: EMERGENCY MEDICINE

## 2023-12-17 PROCEDURE — 63600175 PHARM REV CODE 636 W HCPCS: Performed by: EMERGENCY MEDICINE

## 2023-12-17 PROCEDURE — 96374 THER/PROPH/DIAG INJ IV PUSH: CPT

## 2023-12-17 PROCEDURE — 25000003 PHARM REV CODE 250: Performed by: EMERGENCY MEDICINE

## 2023-12-17 PROCEDURE — 84702 CHORIONIC GONADOTROPIN TEST: CPT | Performed by: EMERGENCY MEDICINE

## 2023-12-17 PROCEDURE — 96361 HYDRATE IV INFUSION ADD-ON: CPT

## 2023-12-17 RX ORDER — ONDANSETRON 4 MG/1
4 TABLET, ORALLY DISINTEGRATING ORAL EVERY 6 HOURS PRN
Qty: 10 TABLET | Refills: 0 | Status: SHIPPED | OUTPATIENT
Start: 2023-12-17

## 2023-12-17 RX ORDER — MISOPROSTOL 200 UG/1
200 TABLET ORAL EVERY 6 HOURS
Qty: 4 TABLET | Refills: 0 | Status: SHIPPED | OUTPATIENT
Start: 2023-12-17 | End: 2023-12-18

## 2023-12-17 RX ORDER — HYDROCODONE BITARTRATE AND ACETAMINOPHEN 5; 325 MG/1; MG/1
1 TABLET ORAL EVERY 6 HOURS PRN
Qty: 12 TABLET | Refills: 0 | Status: SHIPPED | OUTPATIENT
Start: 2023-12-17

## 2023-12-17 RX ORDER — HYDROCODONE BITARTRATE AND ACETAMINOPHEN 7.5; 325 MG/1; MG/1
1 TABLET ORAL EVERY 6 HOURS PRN
Qty: 12 TABLET | Refills: 0 | Status: SHIPPED | OUTPATIENT
Start: 2023-12-17 | End: 2023-12-17 | Stop reason: ALTCHOICE

## 2023-12-17 RX ORDER — PROCHLORPERAZINE EDISYLATE 5 MG/ML
10 INJECTION INTRAMUSCULAR; INTRAVENOUS
Status: COMPLETED | OUTPATIENT
Start: 2023-12-17 | End: 2023-12-17

## 2023-12-17 RX ADMIN — PROCHLORPERAZINE EDISYLATE 10 MG: 5 INJECTION INTRAMUSCULAR; INTRAVENOUS at 10:12

## 2023-12-17 RX ADMIN — SODIUM CHLORIDE 1000 ML: 9 INJECTION, SOLUTION INTRAVENOUS at 10:12

## 2023-12-17 NOTE — ED NOTES
Presents to ED via EMS with c/o severe abd and lower back pain that started on Friday. States being 10weeks pregnant and had an OB appt on 12/12 and was told to possibly have a miscarriage in near future. Pt began having increased vaginal bleeding over passed 2 days. This morning began to have N/V along with shaking while in shower and had multiple large clots passed and increased vaginal bleeding. Pt currently have severe abd pain and lower back pain. No N/V noted at time of assessment. Mother at BS. Call light is within reach. PIV to right hand infusing without problems. Safety is intact. Monitoring in place.

## 2023-12-17 NOTE — ED PROVIDER NOTES
Encounter Date: 2023       History     Chief Complaint   Patient presents with    Vaginal Bleeding     , LMP 2023 presents with c/o abdominal cramping with vaginal bleeding x 3 days. States pain became worse this morning. Fentanyl given by EMS without relief. Presents awake, alert, crying. No distress.      17-year-old female  at approximately 11 weeks gestational age presents emergency department complaining of vaginal bleeding and abdominal pain.  Reports onset 3 days ago, with light vaginal bleeding but heavier today.  Notes diffuse lower abdominal cramping and aching pain.  Denies any fever, chest pain, shortness breath, vomiting, or diarrhea.  No other symptoms reported at this time.      Review of patient's allergies indicates:   Allergen Reactions    Gluten protein Anxiety, Diarrhea, Itching, Other (See Comments) and Swelling     Abdominal pain        Past Medical History:   Diagnosis Date    Pneumonia     Varicella      No past surgical history on file.  Family History   Problem Relation Age of Onset    Breast cancer Maternal Grandmother     Breast cancer Paternal Grandmother     Colon cancer Neg Hx     Ovarian cancer Neg Hx      Social History     Tobacco Use    Smoking status: Never     Passive exposure: Yes    Smokeless tobacco: Never    Tobacco comments:     dad smokes outside   Substance Use Topics    Alcohol use: No    Drug use: No     Review of Systems   Constitutional:  Negative for chills and fever.   HENT:  Negative for congestion.    Respiratory:  Negative for cough and shortness of breath.    Cardiovascular:  Negative for chest pain.   Gastrointestinal:  Positive for abdominal pain.   Musculoskeletal:  Positive for back pain.   Neurological:  Negative for light-headedness and headaches.       Physical Exam     Initial Vitals [23 1012]   BP Pulse Resp Temp SpO2   (!) 135/90 87 20 98 °F (36.7 °C) 97 %      MAP       --         Physical Exam    Nursing note and vitals  reviewed.  Constitutional: She appears well-developed and well-nourished. No distress.   HENT:   Head: Normocephalic and atraumatic.   Eyes: Conjunctivae and EOM are normal. Pupils are equal, round, and reactive to light.   Neck: Neck supple. No tracheal deviation present.   Normal range of motion.  Cardiovascular:  Normal rate and intact distal pulses.           Pulmonary/Chest: No respiratory distress.   Abdominal: Abdomen is soft. She exhibits no distension. There is abdominal tenderness (Lower abdomen). There is guarding (Mild). There is no rebound.   Musculoskeletal:         General: No tenderness. Normal range of motion.      Cervical back: Normal range of motion and neck supple.     Neurological: She is alert and oriented to person, place, and time. She has normal strength. No cranial nerve deficit. GCS score is 15. GCS eye subscore is 4. GCS verbal subscore is 5. GCS motor subscore is 6.   Skin: Skin is warm and dry.         ED Course   Procedures  Labs Reviewed   COMPREHENSIVE METABOLIC PANEL - Abnormal; Notable for the following components:       Result Value    CO2 17 (*)     BUN 5 (*)     All other components within normal limits    Narrative:     Release to patient->Immediate   CBC W/ AUTO DIFFERENTIAL    Narrative:     Release to patient->Immediate   HCG, QUANTITATIVE    Narrative:     Release to patient->Immediate   TYPE & SCREEN              X-Rays:   Independently Interpreted Readings:   Other Readings:  Ultrasound interpreted by radiologist and visualized by me    Imaging Results              US OB <14 Wks, TransAbd, Single Gestation (Final result)  Result time 12/17/23 12:07:09   Procedure changed from US OB <14 Wks TransAbd & TransVag, Single Gestation (XPD)     Final result by Pratik Sommer MD (12/17/23 12:07:09)                   Impression:      Heterogeneous and thickened endometrium which may reflect blood products versus endometrial hyperplasia.  Clinical correlation and with menstrual  cycle advised.    Bilateral ovaries are normal in overall size and appearance demonstrating some vascular flow; however, resistive indices were not quantified secondary to limited ultrasound technique.  Further evaluation/follow-up as warranted.    No IUP or extra uterine gestational sac identified, noting nonvisualized ectopic pregnancy not excluded in the setting of any suspected pregnancy status.  Clinical correlation advised.  Otherwise unremarkable limited pelvic ultrasound elsewhere.      Electronically signed by: Pratik Sommer MD  Date:    12/17/2023  Time:    12:07               Narrative:    EXAMINATION:  US OB <14 WEEKS TRANSABDOM, SINGLE GESTATION    CLINICAL HISTORY:  Abdominal pain;    TECHNIQUE:  Transabdominal only sonography of the pelvis was performed, patient declined transvaginal portion of the exam.    COMPARISON:  Pelvic ultrasound and CT abdomen and pelvis 09/17/2023    FINDINGS:  Uterus: 9.3 x 4.5 x 5.1 cm, somewhat anteflexed.  No discrete fibroid seen.  Endometrium appears heterogeneous and mildly thickened.    No IUP or extra uterine gestational sac seen.    Right ovary: Normal in size.  Some vascular flow identified to the right ovary; however, resistive indices were not quantified.    Left ovary: Normal in size.  Some vascular flow identified to the left ovary; however, resistive indices were not quantified.    Miscellaneous: No Free Fluid.                                      Medications   sodium chloride 0.9% bolus 1,000 mL 1,000 mL (0 mLs Intravenous Stopped 12/17/23 1148)   prochlorperazine injection Soln 10 mg (10 mg Intravenous Given 12/17/23 1027)     Medical Decision Making  17-year-old female presents emergency department complaining of vaginal bleeding and lower abdominal pain at approximately 11 weeks gestational age      Differential: Diverticulitis, cholecystitis, pancreatitis, appendicitis, obstruction, constipation UTI, pyelonephritis, kidney stone, gastroenteritis,  threatened versus inevitable versus missed miscarriage, ectopic      Bleeding seems to have improved.  Patient is resting comfortably with significant improvement in symptoms.  Reviewed case with OBGYN who recommend discharge with Cytotec q.6 hours for 24 hours.  Also give patient prescriptions for his nausea and pain, instructed on home management, prompt follow-up with OB, strict return precautions given.  Vital signs stable, patient family comfortable with discharge at this time.    Problems Addressed:  Miscarriage: acute illness or injury    Amount and/or Complexity of Data Reviewed  External Data Reviewed: notes.     Details: Reviewed most recent OB note documenting baseline medications and past medical history  Labs: ordered.     Details: CBC without leukocytosis, normal H&H; CMP with normal renal and liver function tests, normal electrolytes; hCG down trending  Radiology: ordered and independent interpretation performed. Decision-making details documented in ED Course.    Risk  OTC drugs.  Prescription drug management.  Parenteral controlled substances.                                      Clinical Impression:  Final diagnoses:  [O03.9] Miscarriage (Primary)          ED Disposition Condition    Discharge Stable          ED Prescriptions       Medication Sig Dispense Start Date End Date Auth. Provider    miSOPROStoL (CYTOTEC) 200 MCG Tab Take 1 tablet (200 mcg total) by mouth every 6 (six) hours. for 4 doses 4 tablet 12/17/2023 12/18/2023 Orville Salgado MD    HYDROcodone-acetaminophen (NORCO) 7.5-325 mg per tablet Take 1 tablet by mouth every 6 (six) hours as needed for Pain. 12 tablet 12/17/2023 -- Orville Salgado MD    ondansetron (ZOFRAN-ODT) 4 MG TbDL Take 1 tablet (4 mg total) by mouth every 6 (six) hours as needed (Nausea). 10 tablet 12/17/2023 -- Orville Salgado MD          Follow-up Information       Follow up With Specialties Details Why Contact Kadi Sotelo DNP  Obstetrics and Gynecology Schedule an appointment as soon as possible for a visit   [address] [facility]  [place] [address]  [phone]               Orville Salgado MD  12/17/23 1238

## 2023-12-17 NOTE — DISCHARGE INSTRUCTIONS
I recommend taking ibuprofen 600 mg every 6 hours with food in addition to the prescribed medication as needed for pain.  Please follow-up with your primary care physician for further evaluation and management.  Please return with any new or worsening symptoms.

## 2023-12-17 NOTE — ED NOTES
Back from US and appears more comfortable at this time. VSS. FM at BS. PIV infusing. No acute distress noted at this time. Call light is within reach. Safety remains intact. Monitoring ongoing.

## 2023-12-19 ENCOUNTER — TELEPHONE (OUTPATIENT)
Dept: OBSTETRICS AND GYNECOLOGY | Facility: CLINIC | Age: 18
End: 2023-12-19
Payer: OTHER GOVERNMENT

## 2023-12-19 NOTE — TELEPHONE ENCOUNTER
Attempted to contact patient. No answered.    Pt noted to have gone to the emergency room over the weekend after she had some vaginal bleeding  Given cytotec and discharged with instructions to f/u with OBGYN.    Left VM message for patient to call back so we can discuss everything and I can see how she is doing.    Will need to plan for HCG level this week.  Return phone number left for pt.

## 2023-12-21 ENCOUNTER — TELEPHONE (OUTPATIENT)
Dept: OBSTETRICS AND GYNECOLOGY | Facility: CLINIC | Age: 18
End: 2023-12-21
Payer: OTHER GOVERNMENT

## 2023-12-21 NOTE — TELEPHONE ENCOUNTER
Attempted to contact pt no answer on cell phone.  Left VM message. Will try other contact number in chart.

## 2023-12-21 NOTE — TELEPHONE ENCOUNTER
Attempted to contact patient on home phone number.  No answer.  VM message left with return contact number for patient to please return call so follow up care can be scheduled.

## 2024-01-21 ENCOUNTER — PATIENT MESSAGE (OUTPATIENT)
Dept: OTHER | Facility: OTHER | Age: 19
End: 2024-01-21
Payer: OTHER GOVERNMENT

## 2024-01-28 ENCOUNTER — PATIENT MESSAGE (OUTPATIENT)
Dept: OTHER | Facility: OTHER | Age: 19
End: 2024-01-28
Payer: OTHER GOVERNMENT

## 2024-02-18 ENCOUNTER — PATIENT MESSAGE (OUTPATIENT)
Dept: OTHER | Facility: OTHER | Age: 19
End: 2024-02-18
Payer: OTHER GOVERNMENT

## 2024-03-17 ENCOUNTER — PATIENT MESSAGE (OUTPATIENT)
Dept: OTHER | Facility: OTHER | Age: 19
End: 2024-03-17
Payer: OTHER GOVERNMENT

## 2024-03-31 ENCOUNTER — NURSE TRIAGE (OUTPATIENT)
Dept: ADMINISTRATIVE | Facility: CLINIC | Age: 19
End: 2024-03-31
Payer: OTHER GOVERNMENT

## 2024-03-31 NOTE — TELEPHONE ENCOUNTER
"Pt states she is 6 weeks pregnant and after intercourse she wiped and noted"pink on the tissue".  Denies any abd pain.  Care advice states home care, Patient verbally understands, all questions answered, advised to call back for any worsening symptoms or further needs.     Reason for Disposition   SPOTTING after sexual intercourse (single or brief episode)    Additional Information   Negative: Shock suspected (e.g., cold/pale/clammy skin, too weak to stand, low BP, rapid pulse)   Negative: Difficult to awaken or acting confused (e.g., disoriented, slurred speech)   Negative: Passed out (i.e., lost consciousness, collapsed and was not responding)   Negative: Sounds like a life-threatening emergency to the triager   Negative: SEVERE abdominal pain   Negative: SEVERE vaginal bleeding (e.g., soaking 2 pads per hour or large blood clots and present 2 or more hours)   Negative: SEVERE dizziness (e.g., unable to stand, requires support to walk, feels like passing out)   Negative: [1] MODERATE vaginal bleeding (e.g., soaking 1 pad per hour; clots) AND [2] present > 6 hours   Negative: [1] MODERATE vaginal bleeding (e.g., soaking 1 pad per hour; clots) AND [2] pregnant > 12 weeks   Negative: Passed tissue (e.g., gray-white)   Negative: Shoulder pain   Negative: Pale skin (pallor) of new-onset or worsening   Negative: Patient sounds very sick or weak to the triager   Negative: [1] Constant abdominal pain AND [2] present > 2 hours   Negative: Fever 100.4 F (38.0 C) or higher   Negative: [1] Intermittent lower abdominal pain (e.g., cramping) AND [2] present > 24 hours   Negative: Prior history of "ectopic pregnancy" or previous tubal surgery (e.g., tubal ligation)   Negative: Pain or burning with passing urine (urination)   Negative: Using heparin (e.g., Lovenox) or other strong blood thinner, or known bleeding disorder (e.g., thrombocytopenia)   Negative: MODERATE vaginal bleeding (e.g., soaking 1 pad per hour; clots)   " Negative: Has IUD   Negative: MILD vaginal bleeding (i.e., less than 1 pad / hour; less than patient's usual menstrual bleeding; not just spotting)   Negative: SPOTTING lasts > 48 hours or spotting happens more than once in a week   Negative: Unusual vaginal discharge (e.g., bad smelling, yellow, green, or foamy-white)   Negative: Not feeling pregnant any longer (e.g., breast tenderness or nausea has disappeared)    Protocols used: Pregnancy - Vaginal Bleeding Less Than 20 Weeks [facility]

## 2024-04-01 ENCOUNTER — TELEPHONE (OUTPATIENT)
Dept: OBSTETRICS AND GYNECOLOGY | Facility: CLINIC | Age: 19
End: 2024-04-01
Payer: OTHER GOVERNMENT

## 2024-04-01 NOTE — TELEPHONE ENCOUNTER
Pt reports spotting after intercourse.  Currently having soreness around hip area.  Spotting has resolved.    Reassured pt that spotting after intercourse is common and to continue to monitor.  Bleeding precautions given.  Pt verbalized understanding.

## 2024-04-06 ENCOUNTER — NURSE TRIAGE (OUTPATIENT)
Dept: ADMINISTRATIVE | Facility: CLINIC | Age: 19
End: 2024-04-06
Payer: OTHER GOVERNMENT

## 2024-04-06 NOTE — TELEPHONE ENCOUNTER
"Pt is currently located in [place]. She is approx 6 weeks pregnant and started spotting last week after intercourse. Pt states that she started intermittently spotting again this morning and notes "grainy bits in the toilet." She notes discomfort but denies pain. Hx of miscarriage.  Denies fever.    Care Advice recommends that pt be seen within the next 2-3 days. UCC, ED, or appointment with OBGYN are discussed as care options with pt. Pt states that she has an appointment scheduled with her OBGYN for next Thursday. NT reiterates care advice. Pt verbalizes understanding and is instructed to call back with any new/worsening sxs, questions, or concerns.   Reason for Disposition   MILD vaginal bleeding (i.e., less than 1 pad / hour; less than patient's usual menstrual bleeding; not just spotting)    Additional Information   Negative: Shock suspected (e.g., cold/pale/clammy skin, too weak to stand, low BP, rapid pulse)   Negative: Difficult to awaken or acting confused (e.g., disoriented, slurred speech)   Negative: Passed out (i.e., lost consciousness, collapsed and was not responding)   Negative: Sounds like a life-threatening emergency to the triager   Negative: SEVERE abdominal pain   Negative: SEVERE vaginal bleeding (e.g., soaking 2 pads per hour or large blood clots and present 2 or more hours)   Negative: SEVERE dizziness (e.g., unable to stand, requires support to walk, feels like passing out)   Negative: [1] MODERATE vaginal bleeding (e.g., soaking 1 pad per hour; clots) AND [2] present > 6 hours   Negative: [1] MODERATE vaginal bleeding (e.g., soaking 1 pad per hour; clots) AND [2] pregnant > 12 weeks   Negative: Passed tissue (e.g., gray-white)   Negative: Shoulder pain   Negative: Pale skin (pallor) of new-onset or worsening   Negative: Patient sounds very sick or weak to the triager   Negative: [1] Constant abdominal pain AND [2] present > 2 hours   Negative: Fever 100.4 F (38.0 C) or higher   " "Negative: [1] Intermittent lower abdominal pain (e.g., cramping) AND [2] present > 24 hours   Negative: Prior history of "ectopic pregnancy" or previous tubal surgery (e.g., tubal ligation)   Negative: Pain or burning with passing urine (urination)   Negative: Using heparin (e.g., Lovenox) or other strong blood thinner, or known bleeding disorder (e.g., thrombocytopenia)   Negative: MODERATE vaginal bleeding (e.g., soaking 1 pad per hour; clots)   Negative: Has IUD    Protocols used: Pregnancy - Vaginal Bleeding Less Than 20 Weeks [facility]    "

## 2024-04-08 DIAGNOSIS — O20.9 VAGINAL BLEEDING AFFECTING EARLY PREGNANCY: Primary | ICD-10-CM

## 2024-04-11 ENCOUNTER — OFFICE VISIT (OUTPATIENT)
Dept: OBSTETRICS AND GYNECOLOGY | Facility: CLINIC | Age: 19
End: 2024-04-11
Payer: OTHER GOVERNMENT

## 2024-04-11 ENCOUNTER — LAB VISIT (OUTPATIENT)
Dept: LAB | Facility: OTHER | Age: 19
End: 2024-04-11
Attending: NURSE PRACTITIONER
Payer: OTHER GOVERNMENT

## 2024-04-11 DIAGNOSIS — O20.9 VAGINAL BLEEDING AFFECTING EARLY PREGNANCY: ICD-10-CM

## 2024-04-11 DIAGNOSIS — N92.6 MISSED MENSES: Primary | ICD-10-CM

## 2024-04-11 DIAGNOSIS — Z34.90 EARLY STAGE OF PREGNANCY: ICD-10-CM

## 2024-04-11 LAB
B-HCG UR QL: POSITIVE
CTP QC/QA: YES
HCG INTACT+B SERPL-ACNC: NORMAL MIU/ML
PROGEST SERPL-MCNC: 17.3 NG/ML

## 2024-04-11 PROCEDURE — 99213 OFFICE O/P EST LOW 20 MIN: CPT | Mod: PBBFAC | Performed by: NURSE PRACTITIONER

## 2024-04-11 PROCEDURE — 84702 CHORIONIC GONADOTROPIN TEST: CPT | Performed by: NURSE PRACTITIONER

## 2024-04-11 PROCEDURE — 0502F SUBSEQUENT PRENATAL CARE: CPT | Mod: S$PBB,,, | Performed by: NURSE PRACTITIONER

## 2024-04-11 PROCEDURE — 84144 ASSAY OF PROGESTERONE: CPT | Performed by: NURSE PRACTITIONER

## 2024-04-11 PROCEDURE — 99999 PR PBB SHADOW E&M-EST. PATIENT-LVL III: CPT | Mod: PBBFAC,,, | Performed by: NURSE PRACTITIONER

## 2024-04-11 PROCEDURE — 87086 URINE CULTURE/COLONY COUNT: CPT | Performed by: NURSE PRACTITIONER

## 2024-04-11 PROCEDURE — 87591 N.GONORRHOEAE DNA AMP PROB: CPT | Performed by: NURSE PRACTITIONER

## 2024-04-11 PROCEDURE — 81025 URINE PREGNANCY TEST: CPT | Mod: PBBFAC | Performed by: NURSE PRACTITIONER

## 2024-04-11 PROCEDURE — 87491 CHLMYD TRACH DNA AMP PROBE: CPT | Performed by: NURSE PRACTITIONER

## 2024-04-11 PROCEDURE — 99999PBSHW POCT URINE PREGNANCY: Mod: PBBFAC,,,

## 2024-04-11 PROCEDURE — 36415 COLL VENOUS BLD VENIPUNCTURE: CPT | Performed by: NURSE PRACTITIONER

## 2024-04-13 LAB
BACTERIA UR CULT: NORMAL
BACTERIA UR CULT: NORMAL

## 2024-04-14 ENCOUNTER — PATIENT MESSAGE (OUTPATIENT)
Dept: OTHER | Facility: OTHER | Age: 19
End: 2024-04-14
Payer: OTHER GOVERNMENT

## 2024-04-14 LAB
C TRACH DNA SPEC QL NAA+PROBE: NOT DETECTED
N GONORRHOEA DNA SPEC QL NAA+PROBE: NOT DETECTED

## 2024-04-17 VITALS — DIASTOLIC BLOOD PRESSURE: 72 MMHG | WEIGHT: 159.5 LBS | BODY MASS INDEX: 24.25 KG/M2 | SYSTOLIC BLOOD PRESSURE: 128 MMHG

## 2024-04-18 NOTE — PROGRESS NOTES
CC: Positive Pregnancy Test    HISTORY OF PRESENT ILLNESS:    Brenda Menendez is a 18 y.o. female, ,  Presents today for a routine exam complaining of amenorrhea and positive home urine pregnancy test.  Patient's last menstrual period was 2023 (exact date).   She is not currently on any contraception. Taking PNV.  Reports nausea. Reports breast tenderness. Denies vaginal bleeding and pelvic pain.    Recent episodic cramping and mild bleeding. OBGYN visit in [place], IUP noted.    No reported medical history for patient or FOB. No reported personal/familial history of genetic or chromosomal issues for patient or FOB. No reported abdominal surgeries.      ROS:  GENERAL: No weight changes. No swelling. No fatigue. No fever.  CARDIOVASCULAR: No chest pain. No shortness of breath. No leg cramps.   NEUROLOGICAL: No headaches. No vision changes.  BREASTS: No pain. No lumps. No discharge.  ABDOMEN: No pain. No diarrhea. No constipation.  REPRODUCTIVE: No abnormal bleeding.   VULVA: No pain. No lesions. No itching.  VAGINA: No relaxation. No itching. No odor. No discharge. No lesions.  URINARY: No incontinence. No nocturia. No frequency. No dysuria.    MEDICATIONS AND ALLERGIES:  Reviewed        COMPREHENSIVE GYN HISTORY:  PAP History: Denies abnormal Paps.  Infection History: Denies STDs. Denies PID.  Benign History: Denies uterine fibroids. Denies ovarian cysts. Denies endometriosis. Denies other conditions.  Cancer History: Denies cervical cancer. Denies uterine cancer or hyperplasia. Denies ovarian cancer. Denies vulvar cancer or pre-cancer. Denies vaginal cancer or pre-cancer. Denies breast cancer. Denies colon cancer.  Sexual Activity History: Reports currently being sexually active  Menstrual History: None.  Contraception: None    /72   Wt 72.3 kg (159 lb 8 oz)   LMP 2023 (Exact Date)   BMI 24.25 kg/m²     PE:  AFFECT: Calm, alert and oriented X 3. Interactive during exam  GENERAL: Appears  well-nourished, well-developed, in no acute distress.  HEAD: Normocephalic, atruamatic  ABDOMEN: Soft and nontender without masses or organomegally.  EXTREMITIES: No cyanosis, clubbing or edema. No calf tenderness.  LYMPH NODES: No axillary or inguinal adenopathy.    PROCEDURES:  UPT Positive  Genprobe        ASSESSMENT/PLAN:  Amenorrhea  Positive urine pregnancy test (KATY: , EGA: 7w0d based on recent OBUS    -  Routine prenatal care    Nausea and vomiting in pregnancy    -  Education regarding lifestyle and dietary modifications    -  Advised use of B6/Unisom. Pt will notify us if no relief/worsening symptoms, will consider Zofran if needed.      1st TRIMESTER COUNSELING:   Common complaints of pregnancy  HIV and other routine prenatal tests including  genetic screening  Risk factors identified by prenatal history  Oriented to practice - discussed anticipated course of prenatal care & indications for Ultrasound  Childbirth classes/Hospital facilities   Nutrition and weight gain counseling  Toxoplasmosis precautions (Cats/Raw Meat)  Sexual activity and exercise  Environmental/Work hazards  Travel  Tobacco (Ask, Advise, Assess, Assist, and Arrange), as well as alcohol and drug use  Use of any medications (Including supplements, Vitamins, Herbs, or OTC Drugs)  Domestic violence  Seat belt use      TERATOLOGY COUNSELING:   Discussed indications and options for aneuploidy screening - pamphlets given    -  Pt desires MT21, brochure given, she will contact to discuss out of pocket cost     Dating US in 1-2 weeks  FOLLOW-UP in 1-2 weeks with Dr. Akira Eddy NP    OB/GYN

## 2024-04-22 ENCOUNTER — NURSE TRIAGE (OUTPATIENT)
Dept: ADMINISTRATIVE | Facility: CLINIC | Age: 19
End: 2024-04-22
Payer: OTHER GOVERNMENT

## 2024-04-22 NOTE — TELEPHONE ENCOUNTER
"Brenda reports currently 8 weeks pregnant c/o left hip pain, unable to bear weight on left  "feels like a pinched nerve feeling that began yesterday evening suddenly" Unrelieved by massage & hot warm compresses. Feels like pain is becoming worse. Denies injury. Advised pt per triage protocol to go to nearest ED now for physician fanny. Instructed to call  now if no immediate . V/u.   Reason for Disposition   Can't stand (bear weight) or walk    Additional Information   Negative: Looks like a broken bone or dislocated joint (e.g., crooked or deformed)   Negative: Sounds like a life-threatening emergency to the triager   Negative: Followed a hip injury   Negative: Leg pain is main symptom   Negative: Back pain radiating (shooting) into hip   Negative: [1] SEVERE pain (e.g., excruciating, unable to do any normal activities) AND [2] fever    Protocols used: Hip Pain-A-AH    "

## 2024-04-23 NOTE — TELEPHONE ENCOUNTER
Called pt to follow up on pain.  After applying lidocaine patch and taking Tylenol, pain is better.  Thinks it's from sitting too long.  Trying to move more often.       Offered sooner appt but pt declined.  Keeping appt on Friday at [place].  US appt was scheduled at the [facility] office, but is now rescheduled at the [place] office with her appt with Sameera.  ER precautions given.  Pt verbalized understanding.

## 2024-04-26 ENCOUNTER — INITIAL PRENATAL (OUTPATIENT)
Dept: OBSTETRICS AND GYNECOLOGY | Facility: CLINIC | Age: 19
End: 2024-04-26
Payer: OTHER GOVERNMENT

## 2024-04-26 ENCOUNTER — PATIENT MESSAGE (OUTPATIENT)
Dept: MATERNAL FETAL MEDICINE | Facility: CLINIC | Age: 19
End: 2024-04-26
Payer: OTHER GOVERNMENT

## 2024-04-26 ENCOUNTER — LAB VISIT (OUTPATIENT)
Dept: LAB | Facility: HOSPITAL | Age: 19
End: 2024-04-26
Attending: NURSE PRACTITIONER
Payer: OTHER GOVERNMENT

## 2024-04-26 VITALS — SYSTOLIC BLOOD PRESSURE: 98 MMHG | BODY MASS INDEX: 24.14 KG/M2 | DIASTOLIC BLOOD PRESSURE: 62 MMHG | WEIGHT: 158.75 LBS

## 2024-04-26 DIAGNOSIS — M54.9 BACK PAIN AFFECTING PREGNANCY IN FIRST TRIMESTER: ICD-10-CM

## 2024-04-26 DIAGNOSIS — O99.891 BACK PAIN AFFECTING PREGNANCY IN FIRST TRIMESTER: ICD-10-CM

## 2024-04-26 DIAGNOSIS — Z3A.09 9 WEEKS GESTATION OF PREGNANCY: ICD-10-CM

## 2024-04-26 DIAGNOSIS — Z36.89 ENCOUNTER FOR FETAL ANATOMIC SURVEY: ICD-10-CM

## 2024-04-26 DIAGNOSIS — Z34.90 EARLY STAGE OF PREGNANCY: ICD-10-CM

## 2024-04-26 DIAGNOSIS — F32.A DEPRESSION DURING PREGNANCY IN FIRST TRIMESTER: Primary | ICD-10-CM

## 2024-04-26 DIAGNOSIS — O99.341 DEPRESSION DURING PREGNANCY IN FIRST TRIMESTER: Primary | ICD-10-CM

## 2024-04-26 LAB
ABO + RH BLD: NORMAL
ANION GAP SERPL CALC-SCNC: 7 MMOL/L (ref 8–16)
BASOPHILS # BLD AUTO: 0.04 K/UL (ref 0–0.2)
BASOPHILS NFR BLD: 0.5 % (ref 0–1.9)
BLD GP AB SCN CELLS X3 SERPL QL: NORMAL
BUN SERPL-MCNC: 8 MG/DL (ref 6–20)
CALCIUM SERPL-MCNC: 9.5 MG/DL (ref 8.7–10.5)
CHLORIDE SERPL-SCNC: 107 MMOL/L (ref 95–110)
CO2 SERPL-SCNC: 19 MMOL/L (ref 23–29)
CREAT SERPL-MCNC: 0.7 MG/DL (ref 0.5–1.4)
DIFFERENTIAL METHOD BLD: ABNORMAL
EOSINOPHIL # BLD AUTO: 0.1 K/UL (ref 0–0.5)
EOSINOPHIL NFR BLD: 0.9 % (ref 0–8)
ERYTHROCYTE [DISTWIDTH] IN BLOOD BY AUTOMATED COUNT: 16.1 % (ref 11.5–14.5)
EST. GFR  (NO RACE VARIABLE): >60 ML/MIN/1.73 M^2
GLUCOSE SERPL-MCNC: 73 MG/DL (ref 70–110)
HBV SURFACE AG SERPL QL IA: NORMAL
HCT VFR BLD AUTO: 38.7 % (ref 37–48.5)
HCV AB SERPL QL IA: NORMAL
HGB BLD-MCNC: 13 G/DL (ref 12–16)
HIV 1+2 AB+HIV1 P24 AG SERPL QL IA: NORMAL
IMM GRANULOCYTES # BLD AUTO: 0.04 K/UL (ref 0–0.04)
IMM GRANULOCYTES NFR BLD AUTO: 0.5 % (ref 0–0.5)
LYMPHOCYTES # BLD AUTO: 2.4 K/UL (ref 1–4.8)
LYMPHOCYTES NFR BLD: 30.6 % (ref 18–48)
MCH RBC QN AUTO: 29.8 PG (ref 27–31)
MCHC RBC AUTO-ENTMCNC: 33.6 G/DL (ref 32–36)
MCV RBC AUTO: 89 FL (ref 82–98)
MONOCYTES # BLD AUTO: 0.9 K/UL (ref 0.3–1)
MONOCYTES NFR BLD: 12.1 % (ref 4–15)
NEUTROPHILS # BLD AUTO: 4.2 K/UL (ref 1.8–7.7)
NEUTROPHILS NFR BLD: 55.4 % (ref 38–73)
NRBC BLD-RTO: 0 /100 WBC
PLATELET # BLD AUTO: 253 K/UL (ref 150–450)
PMV BLD AUTO: 12 FL (ref 9.2–12.9)
POTASSIUM SERPL-SCNC: 3.7 MMOL/L (ref 3.5–5.1)
RBC # BLD AUTO: 4.36 M/UL (ref 4–5.4)
SODIUM SERPL-SCNC: 133 MMOL/L (ref 136–145)
SPECIMEN OUTDATE: NORMAL
TREPONEMA PALLIDUM IGG+IGM AB [PRESENCE] IN SERUM OR PLASMA BY IMMUNOASSAY: NONREACTIVE
WBC # BLD AUTO: 7.67 K/UL (ref 3.9–12.7)

## 2024-04-26 PROCEDURE — 86803 HEPATITIS C AB TEST: CPT | Performed by: NURSE PRACTITIONER

## 2024-04-26 PROCEDURE — 0502F SUBSEQUENT PRENATAL CARE: CPT | Mod: ,,, | Performed by: NURSE PRACTITIONER

## 2024-04-26 PROCEDURE — 86901 BLOOD TYPING SEROLOGIC RH(D): CPT | Performed by: NURSE PRACTITIONER

## 2024-04-26 PROCEDURE — 76801 OB US < 14 WKS SINGLE FETUS: CPT | Mod: PBBFAC | Performed by: OBSTETRICS & GYNECOLOGY

## 2024-04-26 PROCEDURE — 36415 COLL VENOUS BLD VENIPUNCTURE: CPT | Performed by: NURSE PRACTITIONER

## 2024-04-26 PROCEDURE — 87340 HEPATITIS B SURFACE AG IA: CPT | Performed by: NURSE PRACTITIONER

## 2024-04-26 PROCEDURE — 80048 BASIC METABOLIC PNL TOTAL CA: CPT | Performed by: NURSE PRACTITIONER

## 2024-04-26 PROCEDURE — 86593 SYPHILIS TEST NON-TREP QUANT: CPT | Performed by: NURSE PRACTITIONER

## 2024-04-26 PROCEDURE — 85025 COMPLETE CBC W/AUTO DIFF WBC: CPT | Performed by: NURSE PRACTITIONER

## 2024-04-26 PROCEDURE — 99213 OFFICE O/P EST LOW 20 MIN: CPT | Mod: PBBFAC | Performed by: NURSE PRACTITIONER

## 2024-04-26 PROCEDURE — 99999 PR PBB SHADOW E&M-EST. PATIENT-LVL III: CPT | Mod: PBBFAC,,, | Performed by: NURSE PRACTITIONER

## 2024-04-26 PROCEDURE — 86762 RUBELLA ANTIBODY: CPT | Performed by: NURSE PRACTITIONER

## 2024-04-26 PROCEDURE — 87389 HIV-1 AG W/HIV-1&-2 AB AG IA: CPT | Performed by: NURSE PRACTITIONER

## 2024-04-26 RX ORDER — SERTRALINE HYDROCHLORIDE 25 MG/1
25 TABLET, FILM COATED ORAL DAILY
Qty: 30 TABLET | Refills: 11 | Status: SHIPPED | OUTPATIENT
Start: 2024-04-26 | End: 2025-04-26

## 2024-04-26 NOTE — PROGRESS NOTES
Presents for initial OB visit. Dating US reviewed. Initial labs today, declines MT21. Anatomy scan ordered. FU in 4 weeks for RYAN

## 2024-04-29 LAB
RUBV IGG SER-ACNC: 53.2 IU/ML
RUBV IGG SER-IMP: REACTIVE

## 2024-05-03 ENCOUNTER — PATIENT MESSAGE (OUTPATIENT)
Dept: MATERNAL FETAL MEDICINE | Facility: CLINIC | Age: 19
End: 2024-05-03
Payer: OTHER GOVERNMENT

## 2024-05-07 PROBLEM — R52 PAIN: Status: ACTIVE | Noted: 2024-05-07

## 2024-05-23 ENCOUNTER — NURSE TRIAGE (OUTPATIENT)
Dept: ADMINISTRATIVE | Facility: CLINIC | Age: 19
End: 2024-05-23
Payer: OTHER GOVERNMENT

## 2024-05-23 ENCOUNTER — TELEPHONE (OUTPATIENT)
Dept: OBSTETRICS AND GYNECOLOGY | Facility: CLINIC | Age: 19
End: 2024-05-23
Payer: OTHER GOVERNMENT

## 2024-05-23 NOTE — TELEPHONE ENCOUNTER
Pt spoke to triage nurse and reported worst headache of her life.     5/23/24 @ 1325 NA [facility] will also send portal message. Message left if headache is as severe as previus triage not describes pt should go to the ED for evaluation.

## 2024-05-23 NOTE — TELEPHONE ENCOUNTER
Pt is 12 weeks pregnant. Pt c/o headache 8-9/10 and dizziness since Monday. States that she also feels on/off tightness of abdomen.  Advised to go to ED/UC if haven't received office visit approval within 30 mins per protocol. VU. Encounter routed to provider as high priority for callback.   Reason for Disposition   SEVERE headache, states 'worst headache' of life    Additional Information   Negative: Difficult to awaken or acting confused (e.g., disoriented, slurred speech)   Negative: Weakness of the face, arm or leg on one side of the body and new-onset   Negative: Numbness of the face, arm or leg on one side of the body and new-onset   Negative: Loss of speech or garbled speech and new-onset   Negative: Passed out (i.e., fainted, collapsed and was not responding)   Negative: Sounds like a life-threatening emergency to the triager   Negative: Unable to walk without falling   Negative: Stiff neck (can't touch chin to chest)   Negative: Possibility of carbon monoxide exposure    Protocols used: Headache-A-OH

## 2024-05-29 ENCOUNTER — ROUTINE PRENATAL (OUTPATIENT)
Dept: OBSTETRICS AND GYNECOLOGY | Facility: CLINIC | Age: 19
End: 2024-05-29
Payer: OTHER GOVERNMENT

## 2024-05-29 ENCOUNTER — LAB VISIT (OUTPATIENT)
Dept: LAB | Facility: HOSPITAL | Age: 19
End: 2024-05-29
Attending: STUDENT IN AN ORGANIZED HEALTH CARE EDUCATION/TRAINING PROGRAM
Payer: OTHER GOVERNMENT

## 2024-05-29 VITALS — WEIGHT: 160.94 LBS | BODY MASS INDEX: 24.47 KG/M2

## 2024-05-29 DIAGNOSIS — Z3A.13 13 WEEKS GESTATION OF PREGNANCY: Primary | ICD-10-CM

## 2024-05-29 DIAGNOSIS — Z3A.13 13 WEEKS GESTATION OF PREGNANCY: ICD-10-CM

## 2024-05-29 LAB
ABO + RH BLD: NORMAL
ALBUMIN SERPL BCP-MCNC: 3.4 G/DL (ref 3.5–5.2)
ALP SERPL-CCNC: 52 U/L (ref 55–135)
ALT SERPL W/O P-5'-P-CCNC: 15 U/L (ref 10–44)
ANION GAP SERPL CALC-SCNC: 8 MMOL/L (ref 8–16)
AST SERPL-CCNC: 17 U/L (ref 10–40)
BASOPHILS # BLD AUTO: 0.05 K/UL (ref 0–0.2)
BASOPHILS NFR BLD: 0.5 % (ref 0–1.9)
BILIRUB SERPL-MCNC: 0.5 MG/DL (ref 0.1–1)
BLD GP AB SCN CELLS X3 SERPL QL: NORMAL
BUN SERPL-MCNC: 6 MG/DL (ref 6–20)
CALCIUM SERPL-MCNC: 9.6 MG/DL (ref 8.7–10.5)
CHLORIDE SERPL-SCNC: 106 MMOL/L (ref 95–110)
CO2 SERPL-SCNC: 18 MMOL/L (ref 23–29)
CREAT SERPL-MCNC: 0.6 MG/DL (ref 0.5–1.4)
DIFFERENTIAL METHOD BLD: ABNORMAL
EOSINOPHIL # BLD AUTO: 0.1 K/UL (ref 0–0.5)
EOSINOPHIL NFR BLD: 1.3 % (ref 0–8)
ERYTHROCYTE [DISTWIDTH] IN BLOOD BY AUTOMATED COUNT: 14.6 % (ref 11.5–14.5)
EST. GFR  (NO RACE VARIABLE): >60 ML/MIN/1.73 M^2
GLUCOSE SERPL-MCNC: 69 MG/DL (ref 70–110)
HBV SURFACE AG SERPL QL IA: NORMAL
HCT VFR BLD AUTO: 38.8 % (ref 37–48.5)
HCV AB SERPL QL IA: NORMAL
HGB BLD-MCNC: 13.1 G/DL (ref 12–16)
HIV 1+2 AB+HIV1 P24 AG SERPL QL IA: NORMAL
IMM GRANULOCYTES # BLD AUTO: 0.05 K/UL (ref 0–0.04)
IMM GRANULOCYTES NFR BLD AUTO: 0.5 % (ref 0–0.5)
LYMPHOCYTES # BLD AUTO: 2.8 K/UL (ref 1–4.8)
LYMPHOCYTES NFR BLD: 27.4 % (ref 18–48)
MCH RBC QN AUTO: 30.5 PG (ref 27–31)
MCHC RBC AUTO-ENTMCNC: 33.8 G/DL (ref 32–36)
MCV RBC AUTO: 90 FL (ref 82–98)
MONOCYTES # BLD AUTO: 0.9 K/UL (ref 0.3–1)
MONOCYTES NFR BLD: 9.1 % (ref 4–15)
NEUTROPHILS # BLD AUTO: 6.2 K/UL (ref 1.8–7.7)
NEUTROPHILS NFR BLD: 61.2 % (ref 38–73)
NRBC BLD-RTO: 0 /100 WBC
PLATELET # BLD AUTO: 232 K/UL (ref 150–450)
PMV BLD AUTO: 12 FL (ref 9.2–12.9)
POTASSIUM SERPL-SCNC: 3.4 MMOL/L (ref 3.5–5.1)
PROT SERPL-MCNC: 7.1 G/DL (ref 6–8.4)
RBC # BLD AUTO: 4.3 M/UL (ref 4–5.4)
SODIUM SERPL-SCNC: 132 MMOL/L (ref 136–145)
SPECIMEN OUTDATE: NORMAL
TREPONEMA PALLIDUM IGG+IGM AB [PRESENCE] IN SERUM OR PLASMA BY IMMUNOASSAY: NONREACTIVE
TSH SERPL DL<=0.005 MIU/L-ACNC: 1.05 UIU/ML (ref 0.4–4)
WBC # BLD AUTO: 10.09 K/UL (ref 3.9–12.7)

## 2024-05-29 PROCEDURE — 86850 RBC ANTIBODY SCREEN: CPT | Performed by: STUDENT IN AN ORGANIZED HEALTH CARE EDUCATION/TRAINING PROGRAM

## 2024-05-29 PROCEDURE — 87389 HIV-1 AG W/HIV-1&-2 AB AG IA: CPT | Performed by: STUDENT IN AN ORGANIZED HEALTH CARE EDUCATION/TRAINING PROGRAM

## 2024-05-29 PROCEDURE — 0502F SUBSEQUENT PRENATAL CARE: CPT | Mod: ,,, | Performed by: STUDENT IN AN ORGANIZED HEALTH CARE EDUCATION/TRAINING PROGRAM

## 2024-05-29 PROCEDURE — 80053 COMPREHEN METABOLIC PANEL: CPT | Performed by: STUDENT IN AN ORGANIZED HEALTH CARE EDUCATION/TRAINING PROGRAM

## 2024-05-29 PROCEDURE — 86803 HEPATITIS C AB TEST: CPT | Performed by: STUDENT IN AN ORGANIZED HEALTH CARE EDUCATION/TRAINING PROGRAM

## 2024-05-29 PROCEDURE — 86762 RUBELLA ANTIBODY: CPT | Performed by: STUDENT IN AN ORGANIZED HEALTH CARE EDUCATION/TRAINING PROGRAM

## 2024-05-29 PROCEDURE — 99999 PR PBB SHADOW E&M-EST. PATIENT-LVL III: CPT | Mod: PBBFAC,,, | Performed by: STUDENT IN AN ORGANIZED HEALTH CARE EDUCATION/TRAINING PROGRAM

## 2024-05-29 PROCEDURE — 85025 COMPLETE CBC W/AUTO DIFF WBC: CPT | Performed by: STUDENT IN AN ORGANIZED HEALTH CARE EDUCATION/TRAINING PROGRAM

## 2024-05-29 PROCEDURE — 86787 VARICELLA-ZOSTER ANTIBODY: CPT | Performed by: STUDENT IN AN ORGANIZED HEALTH CARE EDUCATION/TRAINING PROGRAM

## 2024-05-29 PROCEDURE — 36415 COLL VENOUS BLD VENIPUNCTURE: CPT | Performed by: STUDENT IN AN ORGANIZED HEALTH CARE EDUCATION/TRAINING PROGRAM

## 2024-05-29 PROCEDURE — 86593 SYPHILIS TEST NON-TREP QUANT: CPT | Performed by: STUDENT IN AN ORGANIZED HEALTH CARE EDUCATION/TRAINING PROGRAM

## 2024-05-29 PROCEDURE — 84443 ASSAY THYROID STIM HORMONE: CPT | Performed by: STUDENT IN AN ORGANIZED HEALTH CARE EDUCATION/TRAINING PROGRAM

## 2024-05-29 PROCEDURE — 99213 OFFICE O/P EST LOW 20 MIN: CPT | Mod: PBBFAC,25 | Performed by: STUDENT IN AN ORGANIZED HEALTH CARE EDUCATION/TRAINING PROGRAM

## 2024-05-29 PROCEDURE — 87340 HEPATITIS B SURFACE AG IA: CPT | Performed by: STUDENT IN AN ORGANIZED HEALTH CARE EDUCATION/TRAINING PROGRAM

## 2024-05-29 RX ORDER — METOCLOPRAMIDE 5 MG/1
5 TABLET ORAL 2 TIMES DAILY PRN
Qty: 10 TABLET | Refills: 0 | Status: SHIPPED | OUTPATIENT
Start: 2024-05-29

## 2024-05-29 NOTE — PROGRESS NOTES
at 13w5d   Doing well overall, -LOF/VB/CTX.   S/p ER visit for headache. Also treated for bacteriuria. Reports HA improved but did not resolve. Has tried acetaminophen. No trauma or fall. Not WHOL. Trial reglan + benadryl, rx sent. To let us know if insufficient relief.  PNV compliant. PNLs, ConnectedMOM orders.  cfDNA: declines today. +FM and +CM on Vscan. Patient of Dr. Champion - schedule apts. RTC 4wks or sooner PRN. Return/ER/ehadache precautions reviewed.

## 2024-05-30 LAB
RUBV IGG SER-ACNC: 33.8 IU/ML
RUBV IGG SER-IMP: REACTIVE

## 2024-05-31 LAB
VARICELLA INTERPRETATION: POSITIVE
VARICELLA ZOSTER IGG: 559.4

## 2024-06-03 ENCOUNTER — PATIENT MESSAGE (OUTPATIENT)
Dept: OBSTETRICS AND GYNECOLOGY | Facility: CLINIC | Age: 19
End: 2024-06-03
Payer: OTHER GOVERNMENT

## 2024-06-14 ENCOUNTER — PATIENT MESSAGE (OUTPATIENT)
Dept: OTHER | Facility: OTHER | Age: 19
End: 2024-06-14
Payer: OTHER GOVERNMENT

## 2024-06-21 ENCOUNTER — PATIENT MESSAGE (OUTPATIENT)
Dept: OTHER | Facility: OTHER | Age: 19
End: 2024-06-21
Payer: MEDICAID

## 2024-06-28 ENCOUNTER — ROUTINE PRENATAL (OUTPATIENT)
Dept: OBSTETRICS AND GYNECOLOGY | Facility: CLINIC | Age: 19
End: 2024-06-28
Payer: MEDICAID

## 2024-06-28 ENCOUNTER — PATIENT MESSAGE (OUTPATIENT)
Dept: ADMINISTRATIVE | Facility: OTHER | Age: 19
End: 2024-06-28
Payer: MEDICAID

## 2024-06-28 VITALS
SYSTOLIC BLOOD PRESSURE: 110 MMHG | WEIGHT: 163.13 LBS | DIASTOLIC BLOOD PRESSURE: 60 MMHG | BODY MASS INDEX: 24.81 KG/M2

## 2024-06-28 DIAGNOSIS — R51.9 NONINTRACTABLE HEADACHE, UNSPECIFIED CHRONICITY PATTERN, UNSPECIFIED HEADACHE TYPE: Primary | ICD-10-CM

## 2024-06-28 DIAGNOSIS — K22.9 ESOPHAGEAL DISORDER: ICD-10-CM

## 2024-06-28 PROCEDURE — 99999 PR PBB SHADOW E&M-EST. PATIENT-LVL III: CPT | Mod: PBBFAC,,, | Performed by: STUDENT IN AN ORGANIZED HEALTH CARE EDUCATION/TRAINING PROGRAM

## 2024-06-28 PROCEDURE — 99213 OFFICE O/P EST LOW 20 MIN: CPT | Mod: PBBFAC | Performed by: STUDENT IN AN ORGANIZED HEALTH CARE EDUCATION/TRAINING PROGRAM

## 2024-06-28 NOTE — PROGRESS NOTES
at 18w0d   Doing well overall, +FM, -LOF/VB/CTX. No fevers, dysuria, or hematuria.   N/V improved, learning food triggers.   Migraines have improved! Offered Neuro consult for further evaluation, pt agreeable.   Reports new rash on cheeks of face, noted when she had previous emesis. Also occurred without emesis, but unsure what the trigger was. Recommend patient present for evaluation with next occurrence or at least photograph to help assess.  Mother reports patient had to have esophageal surgery as a . States the patient had severe GERD and that it was discovered part of the esophagus was not developed? The trouble swallowing caused respiratory distress. Was ultimately discharge to home with some form of monitors. ?TEF. Upcoming anatomy US.   Reviewed GDM screening upcoming. Pt's questions answered. RTC 4wks or sooner PRN. [facility]ist OB ED precautions. Patient of Dr. Champion.

## 2024-07-05 ENCOUNTER — PROCEDURE VISIT (OUTPATIENT)
Dept: MATERNAL FETAL MEDICINE | Facility: CLINIC | Age: 19
End: 2024-07-05
Payer: MEDICAID

## 2024-07-05 DIAGNOSIS — Z36.2 ENCOUNTER FOR FOLLOW-UP ULTRASOUND OF FETAL ANATOMY: Primary | ICD-10-CM

## 2024-07-05 DIAGNOSIS — Z36.89 ENCOUNTER FOR FETAL ANATOMIC SURVEY: ICD-10-CM

## 2024-07-05 PROCEDURE — 76805 OB US >/= 14 WKS SNGL FETUS: CPT | Mod: PBBFAC | Performed by: OBSTETRICS & GYNECOLOGY

## 2024-07-12 ENCOUNTER — PATIENT MESSAGE (OUTPATIENT)
Dept: OTHER | Facility: OTHER | Age: 19
End: 2024-07-12
Payer: MEDICAID

## 2024-07-29 ENCOUNTER — PATIENT MESSAGE (OUTPATIENT)
Dept: OBSTETRICS AND GYNECOLOGY | Facility: CLINIC | Age: 19
End: 2024-07-29
Payer: MEDICAID

## 2024-08-09 ENCOUNTER — PROCEDURE VISIT (OUTPATIENT)
Dept: MATERNAL FETAL MEDICINE | Facility: CLINIC | Age: 19
End: 2024-08-09
Payer: MEDICAID

## 2024-08-09 ENCOUNTER — PATIENT MESSAGE (OUTPATIENT)
Dept: OTHER | Facility: OTHER | Age: 19
End: 2024-08-09
Payer: MEDICAID

## 2024-08-09 DIAGNOSIS — Z36.2 ENCOUNTER FOR FOLLOW-UP ULTRASOUND OF FETAL ANATOMY: ICD-10-CM

## 2024-08-09 PROCEDURE — 76816 OB US FOLLOW-UP PER FETUS: CPT | Mod: PBBFAC | Performed by: OBSTETRICS & GYNECOLOGY

## 2024-08-16 DIAGNOSIS — Z3A.25 25 WEEKS GESTATION OF PREGNANCY: Primary | ICD-10-CM

## 2024-08-19 ENCOUNTER — TELEPHONE (OUTPATIENT)
Dept: OBSTETRICS AND GYNECOLOGY | Facility: CLINIC | Age: 19
End: 2024-08-19
Payer: MEDICAID

## 2024-08-19 ENCOUNTER — PATIENT MESSAGE (OUTPATIENT)
Dept: OBSTETRICS AND GYNECOLOGY | Facility: CLINIC | Age: 19
End: 2024-08-19
Payer: MEDICAID

## 2024-08-19 NOTE — TELEPHONE ENCOUNTER
----- Message from Carmen Duenas MD sent at 8/16/2024 12:55 PM CDT -----  Please add lab visit to patient's OB visit next week. For 1 hour glucose, CBC. Orders are in. Please call and let patient know how to do 1 hour glucose/lab visit, looks like she missed last apt. Thank you!

## 2024-08-19 NOTE — TELEPHONE ENCOUNTER
Called pt to follow up in regards to scheduling labs prior to upcoming appt  No answer  LVM and call back number    Labs are scheduled at 8:20 am at [facility]    ND

## 2024-08-23 ENCOUNTER — LAB VISIT (OUTPATIENT)
Dept: LAB | Facility: HOSPITAL | Age: 19
End: 2024-08-23
Attending: STUDENT IN AN ORGANIZED HEALTH CARE EDUCATION/TRAINING PROGRAM
Payer: MEDICAID

## 2024-08-23 ENCOUNTER — ROUTINE PRENATAL (OUTPATIENT)
Dept: OBSTETRICS AND GYNECOLOGY | Facility: CLINIC | Age: 19
End: 2024-08-23
Payer: MEDICAID

## 2024-08-23 ENCOUNTER — PATIENT MESSAGE (OUTPATIENT)
Dept: OTHER | Facility: OTHER | Age: 19
End: 2024-08-23
Payer: MEDICAID

## 2024-08-23 VITALS — DIASTOLIC BLOOD PRESSURE: 62 MMHG | BODY MASS INDEX: 26.82 KG/M2 | WEIGHT: 176.38 LBS | SYSTOLIC BLOOD PRESSURE: 98 MMHG

## 2024-08-23 DIAGNOSIS — Z3A.25 25 WEEKS GESTATION OF PREGNANCY: ICD-10-CM

## 2024-08-23 DIAGNOSIS — Z3A.26 26 WEEKS GESTATION OF PREGNANCY: Primary | ICD-10-CM

## 2024-08-23 LAB
BASOPHILS # BLD AUTO: 0.03 K/UL (ref 0–0.2)
BASOPHILS NFR BLD: 0.3 % (ref 0–1.9)
DIFFERENTIAL METHOD BLD: ABNORMAL
EOSINOPHIL # BLD AUTO: 0.1 K/UL (ref 0–0.5)
EOSINOPHIL NFR BLD: 1.3 % (ref 0–8)
ERYTHROCYTE [DISTWIDTH] IN BLOOD BY AUTOMATED COUNT: 13.2 % (ref 11.5–14.5)
GLUCOSE SERPL-MCNC: 96 MG/DL (ref 70–140)
HCT VFR BLD AUTO: 34.6 % (ref 37–48.5)
HGB BLD-MCNC: 11.2 G/DL (ref 12–16)
IMM GRANULOCYTES # BLD AUTO: 0.09 K/UL (ref 0–0.04)
IMM GRANULOCYTES NFR BLD AUTO: 0.9 % (ref 0–0.5)
LYMPHOCYTES # BLD AUTO: 2.5 K/UL (ref 1–4.8)
LYMPHOCYTES NFR BLD: 25.7 % (ref 18–48)
MCH RBC QN AUTO: 30 PG (ref 27–31)
MCHC RBC AUTO-ENTMCNC: 32.4 G/DL (ref 32–36)
MCV RBC AUTO: 93 FL (ref 82–98)
MONOCYTES # BLD AUTO: 0.8 K/UL (ref 0.3–1)
MONOCYTES NFR BLD: 8.5 % (ref 4–15)
NEUTROPHILS # BLD AUTO: 6 K/UL (ref 1.8–7.7)
NEUTROPHILS NFR BLD: 63.3 % (ref 38–73)
NRBC BLD-RTO: 0 /100 WBC
PLATELET # BLD AUTO: 222 K/UL (ref 150–450)
PMV BLD AUTO: 12.2 FL (ref 9.2–12.9)
RBC # BLD AUTO: 3.73 M/UL (ref 4–5.4)
WBC # BLD AUTO: 9.54 K/UL (ref 3.9–12.7)

## 2024-08-23 PROCEDURE — 82950 GLUCOSE TEST: CPT | Performed by: STUDENT IN AN ORGANIZED HEALTH CARE EDUCATION/TRAINING PROGRAM

## 2024-08-23 PROCEDURE — 99999 PR PBB SHADOW E&M-EST. PATIENT-LVL II: CPT | Mod: PBBFAC,,, | Performed by: STUDENT IN AN ORGANIZED HEALTH CARE EDUCATION/TRAINING PROGRAM

## 2024-08-23 PROCEDURE — 85025 COMPLETE CBC W/AUTO DIFF WBC: CPT | Performed by: STUDENT IN AN ORGANIZED HEALTH CARE EDUCATION/TRAINING PROGRAM

## 2024-08-23 PROCEDURE — 99212 OFFICE O/P EST SF 10 MIN: CPT | Mod: PBBFAC | Performed by: STUDENT IN AN ORGANIZED HEALTH CARE EDUCATION/TRAINING PROGRAM

## 2024-08-23 PROCEDURE — 36415 COLL VENOUS BLD VENIPUNCTURE: CPT | Performed by: STUDENT IN AN ORGANIZED HEALTH CARE EDUCATION/TRAINING PROGRAM

## 2024-08-23 NOTE — PROGRESS NOTES
at 26w0d   Doing well overall, +FM, -LOF/VB/CTX.   PNV compliant. GDM screening today. TDAP next visit. Working on birth plan. Interested in not having epidural, reviewed options. Pt's questions answered. RTC 3wks or sooner PRN. Muslim OB ED precautions. Patient of Dr. Champion.

## 2024-08-27 ENCOUNTER — TELEPHONE (OUTPATIENT)
Dept: OBSTETRICS AND GYNECOLOGY | Facility: CLINIC | Age: 19
End: 2024-08-27
Payer: MEDICAID

## 2024-08-27 NOTE — TELEPHONE ENCOUNTER
Called pt to follow up in regards to picking up signed form  No answer  LVM and call back number    Left copy of form at check out window    ND

## 2024-09-01 ENCOUNTER — NURSE TRIAGE (OUTPATIENT)
Dept: ADMINISTRATIVE | Facility: CLINIC | Age: 19
End: 2024-09-01
Payer: MEDICAID

## 2024-09-01 NOTE — TELEPHONE ENCOUNTER
Pt is 27 weeks pregnant and noted swelling to both ankles for 1.5 weeks and began with right calf pain that started in her ankle and moved up to her calf area since yesterday.  Care advice states to see a provider within 4 hours.  Patient verbally understands, all questions answered, advised to call back for any worsening symptoms or further needs.     Reason for Disposition   [1] Thigh or calf pain AND [2] only 1 side AND [3] present > 1 hour    Additional Information   Negative: SEVERE difficulty breathing (e.g., struggling for each breath, speaks in single words)   Negative: Sounds like a life-threatening emergency to the triager   Negative: SEVERE leg swelling (e.g., swelling extends above knee, entire leg is swollen)   Negative: Chest pain   Negative: [1] Difficulty breathing AND [2] new-onset or getting worse   Negative: [1] Pregnant 20 or more weeks AND [2] new blurred vision or vision changes   Negative: [1] Pregnant 20 or more weeks AND [2] severe headache AND [3] not relieved with acetaminophen (e.g., Tylenol)   Negative: [1] Pregnant 20 or more weeks AND [2] upper abdominal pain lasts > 1 hour   Negative: [1] Pregnant 23 or more weeks AND [2] baby is moving less today (e.g., kick count < 5 in 1 hour or < 10 in 2 hours)   Negative: [1] Red area or streak AND [2] fever   Negative: [1] Swelling is painful to touch AND [2] fever   Negative: [1] Cast on leg or ankle AND [2] now increased pain   Negative: Patient sounds very sick or weak to the triager   Negative: [1] Pregnant 20 or more weeks AND [2] swelling of face, arm or hands  (Exception: Slight puffiness of fingers.)   Negative: [1] Pregnant 20 or more weeks AND [2] Systolic BP >= 140 OR Diastolic BP >= 90    Protocols used: Pregnancy - Leg Swelling and Edema-A-AH

## 2024-09-03 ENCOUNTER — TELEPHONE (OUTPATIENT)
Dept: OBSTETRICS AND GYNECOLOGY | Facility: CLINIC | Age: 19
End: 2024-09-03
Payer: MEDICAID

## 2024-09-05 ENCOUNTER — TELEPHONE (OUTPATIENT)
Dept: OBSTETRICS AND GYNECOLOGY | Facility: CLINIC | Age: 19
End: 2024-09-05
Payer: MEDICAID

## 2024-09-05 NOTE — TELEPHONE ENCOUNTER
Pt called office  Called transferred from call center    Pt was returning call from 09-03-24 which was a curtesy call from the triage note  Pt had called previously in regards to swelling in ankles concern  Stated swelling will go down after elevating legs  Has been able to take breaks throughout the day at work  Able to drink water during work hours as well     Stated in left calf feels a charley horse sensation  Will massage leg to obtain relief  No hip or pelvic pain   Denies lightheaded, dizziness, SOB, CP, palpitations, discoloration of ankles  No sharp shooting pain or trouble walking    Stated has not been monitoring sodium intake  Has recently had fried rice, mexican food, and spaghetti   C/o indigestion and acid reflux    Advised pt to mindfully consume and watch for hidden salts in processed foods  Increase water intake  Take more frequent breaks  Elevate legs more often  Wear compression socks throughout the day     Pt expressed understanding  No further questions    ND

## 2024-09-06 ENCOUNTER — PATIENT MESSAGE (OUTPATIENT)
Dept: OTHER | Facility: OTHER | Age: 19
End: 2024-09-06
Payer: MEDICAID

## 2024-09-20 ENCOUNTER — ROUTINE PRENATAL (OUTPATIENT)
Dept: OBSTETRICS AND GYNECOLOGY | Facility: CLINIC | Age: 19
End: 2024-09-20
Payer: MEDICAID

## 2024-09-20 ENCOUNTER — PATIENT MESSAGE (OUTPATIENT)
Dept: OTHER | Facility: OTHER | Age: 19
End: 2024-09-20
Payer: MEDICAID

## 2024-09-20 VITALS
DIASTOLIC BLOOD PRESSURE: 60 MMHG | BODY MASS INDEX: 27.15 KG/M2 | SYSTOLIC BLOOD PRESSURE: 100 MMHG | WEIGHT: 178.56 LBS

## 2024-09-20 DIAGNOSIS — Z3A.30 30 WEEKS GESTATION OF PREGNANCY: Primary | ICD-10-CM

## 2024-09-20 PROCEDURE — 99213 OFFICE O/P EST LOW 20 MIN: CPT | Mod: PBBFAC | Performed by: STUDENT IN AN ORGANIZED HEALTH CARE EDUCATION/TRAINING PROGRAM

## 2024-09-20 PROCEDURE — 99999 PR PBB SHADOW E&M-EST. PATIENT-LVL III: CPT | Mod: PBBFAC,,, | Performed by: STUDENT IN AN ORGANIZED HEALTH CARE EDUCATION/TRAINING PROGRAM

## 2024-09-20 NOTE — PROGRESS NOTES
at 30w0d   Doing well overall, +FM, -LOF/VB/CTX.   Pedi: Dr. Carmen Islas. PPC: likely POPs.  BF: yes. Calf pain resolved with increased water intake. TDAP, flu today. Pt's questions answered. RTC 3wks or sooner PRN. [employer]ab[employer] OB ED precautions. Patient of Dr. Champion. Presents with mother.

## 2024-10-04 ENCOUNTER — PATIENT MESSAGE (OUTPATIENT)
Dept: OTHER | Facility: OTHER | Age: 19
End: 2024-10-04
Payer: MEDICAID

## 2024-10-10 ENCOUNTER — NURSE TRIAGE (OUTPATIENT)
Dept: ADMINISTRATIVE | Facility: CLINIC | Age: 19
End: 2024-10-10
Payer: MEDICAID

## 2024-10-10 ENCOUNTER — PATIENT MESSAGE (OUTPATIENT)
Dept: OBSTETRICS AND GYNECOLOGY | Facility: CLINIC | Age: 19
End: 2024-10-10
Payer: MEDICAID

## 2024-10-10 NOTE — TELEPHONE ENCOUNTER
Brenda is 32 weeks, 6 days pregnant c/o  yellow vaginal discharge described as yellow, milky consistency and smells sweet. Symptom present since last OV on . Advised pt per triage protocol to see physician within next 3 days. Instructed pt to contact OBGYN office during office hours tomorrow a.m. Home care and call back instructions provided per triage protocol. V/u.   Reason for Disposition   Abnormal color vaginal discharge (i.e., yellow, green, gray)    Additional Information   Negative: [1] Vaginal bleeding AND [2] pregnant 20 or more weeks   Negative: [1] Vaginal bleeding AND [2] pregnant < 20 weeks   Negative: [1] Having contractions or other symptoms of labor AND [2] < 37 weeks pregnant (i.e., )   Negative: Leakage of fluid (trickle, gush) from the vagina   Negative: Pain or burning with passing urine (urination) is main symptom   Negative: [1] Pregnant 23 or more weeks AND [2] baby is moving less today (e.g., kick count < 5 in 1 hour or < 10 in 2 hours)   Negative: Patient sounds very sick or weak to the triager   Negative: [1] Constant abdominal pain AND [2] present > 2 hours   Negative: [1] Intermittent lower abdominal pain AND [2] present > 24 hours   Negative: [1] Pregnant 24 - 36 weeks () AND [2] pinkish or brownish mucous discharge (Exception: Single episode of faint spotting when wiping, or slight spotting after intercourse or pelvic exam.)   Negative: [1] Yellow or green vaginal discharge AND [2] fever   Negative: Painful rash with tiny water blisters   Negative: [1] Rash (e.g., redness, tiny bumps, sore) of genital area AND [2] present > 24 hours    Protocols used: Pregnancy - Vaginal Hacotyddd-H-JB

## 2024-10-11 ENCOUNTER — ROUTINE PRENATAL (OUTPATIENT)
Dept: OBSTETRICS AND GYNECOLOGY | Facility: CLINIC | Age: 19
End: 2024-10-11
Payer: OTHER GOVERNMENT

## 2024-10-11 ENCOUNTER — TELEPHONE (OUTPATIENT)
Dept: OBSTETRICS AND GYNECOLOGY | Facility: CLINIC | Age: 19
End: 2024-10-11
Payer: COMMERCIAL

## 2024-10-11 VITALS
BODY MASS INDEX: 28.24 KG/M2 | WEIGHT: 185.75 LBS | SYSTOLIC BLOOD PRESSURE: 104 MMHG | DIASTOLIC BLOOD PRESSURE: 72 MMHG

## 2024-10-11 DIAGNOSIS — O26.893 DYSURIA DURING PREGNANCY IN THIRD TRIMESTER: ICD-10-CM

## 2024-10-11 DIAGNOSIS — O26.893 VAGINAL DISCHARGE DURING PREGNANCY IN THIRD TRIMESTER: ICD-10-CM

## 2024-10-11 DIAGNOSIS — R30.0 DYSURIA DURING PREGNANCY IN THIRD TRIMESTER: ICD-10-CM

## 2024-10-11 DIAGNOSIS — Z3A.33 33 WEEKS GESTATION OF PREGNANCY: Primary | ICD-10-CM

## 2024-10-11 DIAGNOSIS — N89.8 VAGINAL DISCHARGE DURING PREGNANCY IN THIRD TRIMESTER: ICD-10-CM

## 2024-10-11 LAB
BILIRUB SERPL-MCNC: ABNORMAL MG/DL
BLOOD URINE, POC: ABNORMAL
CLARITY, POC UA: ABNORMAL
COLOR, POC UA: ABNORMAL
GLUCOSE UR QL STRIP: 50
KETONES UR QL STRIP: ABNORMAL
LEUKOCYTE ESTERASE URINE, POC: ABNORMAL
NITRITE, POC UA: ABNORMAL
PH, POC UA: 7
PROTEIN, POC: ABNORMAL
SPECIFIC GRAVITY, POC UA: 1.01
UROBILINOGEN, POC UA: 1

## 2024-10-11 PROCEDURE — 0352U VAGINOSIS SCREEN BY DNA PROBE: CPT

## 2024-10-11 PROCEDURE — 99212 OFFICE O/P EST SF 10 MIN: CPT | Mod: PBBFAC

## 2024-10-11 PROCEDURE — 99999 PR PBB SHADOW E&M-EST. PATIENT-LVL II: CPT | Mod: PBBFAC,,,

## 2024-10-11 PROCEDURE — 87086 URINE CULTURE/COLONY COUNT: CPT

## 2024-10-11 RX ORDER — CEPHALEXIN 500 MG/1
500 CAPSULE ORAL EVERY 6 HOURS
Qty: 28 CAPSULE | Refills: 0 | Status: SHIPPED | OUTPATIENT
Start: 2024-10-11 | End: 2024-10-18

## 2024-10-11 NOTE — TELEPHONE ENCOUNTER
[place] pt called in 33wks ob pt started leaking discharge and would like to discuss, pt states when she wipe it's yellow and milky and sweet smelling, pt would like a call to discuss     10/11/24 @ 0816 Called pt no answer left message. Asked pt to call back, will also send portal message.       @ 0830 Pt returned call, is having pressure, feels like she can not empty her bladder, unsure if she is leaking urine or fluid, reports a yellow sweet smelling discharge, Pt scheduled to see LB Polk NP at 0940 this morning at [facility], [place]. Carlos flores

## 2024-10-11 NOTE — PROGRESS NOTES
OB pt at 33w0d here with complaints of discharge. Concerned about PROM.  Endorses yellow milky discharge with sweet odor.  Symptoms have been present for the last couple of weeks. Denies itching or irritation.    Reports good FM.  -LOF/VB.  Some elena hyde CTX.   Pelvic Exam:  Thin white/off white discharge. No pooling fluid. No distinct odor. Affirm collected. Cervix high and closed. Nitrazine test negative.  Reassurance given. Likely physiological discharge. Will follow up affirm.  Dip today dirty and nitrite positive. Pt denies dysuria, frequency, or urgency. Will send for culture and start antibiotics for over the weekend.  Reviewed warning signs of Labor and Preeclampsia.   RTC for ERI visit or sooner PRN.

## 2024-10-15 RX ORDER — METRONIDAZOLE 500 MG/1
500 TABLET ORAL 2 TIMES DAILY
Qty: 14 TABLET | Refills: 0 | Status: SHIPPED | OUTPATIENT
Start: 2024-10-15 | End: 2024-10-22

## 2024-10-25 ENCOUNTER — PATIENT MESSAGE (OUTPATIENT)
Dept: OTHER | Facility: OTHER | Age: 19
End: 2024-10-25
Payer: MEDICAID

## 2024-11-01 ENCOUNTER — PATIENT MESSAGE (OUTPATIENT)
Dept: OBSTETRICS AND GYNECOLOGY | Facility: CLINIC | Age: 19
End: 2024-11-01
Payer: MEDICAID

## 2024-11-04 ENCOUNTER — LAB VISIT (OUTPATIENT)
Dept: LAB | Facility: HOSPITAL | Age: 19
End: 2024-11-04
Attending: OBSTETRICS & GYNECOLOGY
Payer: MEDICAID

## 2024-11-04 ENCOUNTER — ROUTINE PRENATAL (OUTPATIENT)
Dept: OBSTETRICS AND GYNECOLOGY | Facility: CLINIC | Age: 19
End: 2024-11-04
Payer: MEDICAID

## 2024-11-04 VITALS
DIASTOLIC BLOOD PRESSURE: 83 MMHG | SYSTOLIC BLOOD PRESSURE: 135 MMHG | WEIGHT: 189.38 LBS | BODY MASS INDEX: 28.79 KG/M2

## 2024-11-04 DIAGNOSIS — Z34.93 NORMAL PREGNANCY IN THIRD TRIMESTER: Primary | ICD-10-CM

## 2024-11-04 DIAGNOSIS — Z3A.36 36 WEEKS GESTATION OF PREGNANCY: ICD-10-CM

## 2024-11-04 LAB
BASOPHILS # BLD AUTO: 0.05 K/UL (ref 0–0.2)
BASOPHILS NFR BLD: 0.4 % (ref 0–1.9)
DIFFERENTIAL METHOD BLD: ABNORMAL
EOSINOPHIL # BLD AUTO: 0.1 K/UL (ref 0–0.5)
EOSINOPHIL NFR BLD: 0.7 % (ref 0–8)
ERYTHROCYTE [DISTWIDTH] IN BLOOD BY AUTOMATED COUNT: 15 % (ref 11.5–14.5)
HCT VFR BLD AUTO: 34.5 % (ref 37–48.5)
HGB BLD-MCNC: 11.2 G/DL (ref 12–16)
HIV 1+2 AB+HIV1 P24 AG SERPL QL IA: NORMAL
IMM GRANULOCYTES # BLD AUTO: 0.21 K/UL (ref 0–0.04)
IMM GRANULOCYTES NFR BLD AUTO: 1.7 % (ref 0–0.5)
LYMPHOCYTES # BLD AUTO: 2.4 K/UL (ref 1–4.8)
LYMPHOCYTES NFR BLD: 19.8 % (ref 18–48)
MCH RBC QN AUTO: 26.4 PG (ref 27–31)
MCHC RBC AUTO-ENTMCNC: 32.5 G/DL (ref 32–36)
MCV RBC AUTO: 81 FL (ref 82–98)
MONOCYTES # BLD AUTO: 1.3 K/UL (ref 0.3–1)
MONOCYTES NFR BLD: 10.7 % (ref 4–15)
NEUTROPHILS # BLD AUTO: 8.2 K/UL (ref 1.8–7.7)
NEUTROPHILS NFR BLD: 66.7 % (ref 38–73)
NRBC BLD-RTO: 0 /100 WBC
PLATELET # BLD AUTO: 280 K/UL (ref 150–450)
PMV BLD AUTO: 11.8 FL (ref 9.2–12.9)
RBC # BLD AUTO: 4.24 M/UL (ref 4–5.4)
TREPONEMA PALLIDUM IGG+IGM AB [PRESENCE] IN SERUM OR PLASMA BY IMMUNOASSAY: NONREACTIVE
WBC # BLD AUTO: 12.3 K/UL (ref 3.9–12.7)

## 2024-11-04 PROCEDURE — 86593 SYPHILIS TEST NON-TREP QUANT: CPT | Performed by: OBSTETRICS & GYNECOLOGY

## 2024-11-04 PROCEDURE — 87081 CULTURE SCREEN ONLY: CPT | Performed by: OBSTETRICS & GYNECOLOGY

## 2024-11-04 PROCEDURE — 99213 OFFICE O/P EST LOW 20 MIN: CPT | Mod: PBBFAC,TH | Performed by: OBSTETRICS & GYNECOLOGY

## 2024-11-04 PROCEDURE — 99214 OFFICE O/P EST MOD 30 MIN: CPT | Mod: S$PBB,TH,, | Performed by: OBSTETRICS & GYNECOLOGY

## 2024-11-04 PROCEDURE — 36415 COLL VENOUS BLD VENIPUNCTURE: CPT | Performed by: OBSTETRICS & GYNECOLOGY

## 2024-11-04 PROCEDURE — 99999 PR PBB SHADOW E&M-EST. PATIENT-LVL III: CPT | Mod: PBBFAC,,, | Performed by: OBSTETRICS & GYNECOLOGY

## 2024-11-04 PROCEDURE — 87389 HIV-1 AG W/HIV-1&-2 AB AG IA: CPT | Performed by: OBSTETRICS & GYNECOLOGY

## 2024-11-04 PROCEDURE — 85025 COMPLETE CBC W/AUTO DIFF WBC: CPT | Performed by: OBSTETRICS & GYNECOLOGY

## 2024-11-04 NOTE — PROGRESS NOTES
36w3d without major complaints.   R/B/A vaginal delivery,  delivery, operative delivery, and blood transfusion discussed today. All questions answered and patient voices understanding. Consents signed.   GBS, HIV, RPR, CBC collected.   Labor precautions discussed.   RTC in 1 weeks for routine PNC.     I spent a total of 20 minutes on the day of the visit. This includes face to face time and non-face to face time preparing to see the patient (eg, review of tests with interpretation of results and explanation of results to patient), obtaining and/or reviewing separately obtained history, documenting clinical information, appropriate prenatal counseling, and care coordination.

## 2024-11-06 ENCOUNTER — PATIENT MESSAGE (OUTPATIENT)
Dept: OBSTETRICS AND GYNECOLOGY | Facility: CLINIC | Age: 19
End: 2024-11-06
Payer: MEDICAID

## 2024-11-06 ENCOUNTER — HOSPITAL ENCOUNTER (EMERGENCY)
Facility: OTHER | Age: 19
Discharge: HOME | End: 2024-11-06
Attending: OBSTETRICS & GYNECOLOGY
Payer: MEDICAID

## 2024-11-06 VITALS
OXYGEN SATURATION: 100 % | DIASTOLIC BLOOD PRESSURE: 72 MMHG | HEART RATE: 79 BPM | RESPIRATION RATE: 20 BRPM | SYSTOLIC BLOOD PRESSURE: 109 MMHG | TEMPERATURE: 98 F

## 2024-11-06 DIAGNOSIS — Z3A.36 36 WEEKS GESTATION OF PREGNANCY: ICD-10-CM

## 2024-11-06 DIAGNOSIS — O47.00 PRETERM UTERINE CONTRACTIONS: Primary | ICD-10-CM

## 2024-11-06 PROCEDURE — 59025 FETAL NON-STRESS TEST: CPT

## 2024-11-06 PROCEDURE — 99284 EMERGENCY DEPT VISIT MOD MDM: CPT | Mod: ,,, | Performed by: OBSTETRICS & GYNECOLOGY

## 2024-11-06 PROCEDURE — 99284 EMERGENCY DEPT VISIT MOD MDM: CPT | Mod: 25

## 2024-11-06 PROCEDURE — 59025 FETAL NON-STRESS TEST: CPT | Mod: 26,,, | Performed by: OBSTETRICS & GYNECOLOGY

## 2024-11-06 RX ORDER — ACETAMINOPHEN 500 MG
1000 TABLET ORAL ONCE
Status: DISCONTINUED | OUTPATIENT
Start: 2024-11-06 | End: 2024-11-06 | Stop reason: HOSPADM

## 2024-11-06 NOTE — ED PROVIDER NOTES
Encounter Date: 2024       History   No chief complaint on file.    Brenda Menendez is a 18 y.o. F at 36w5d who presents complaining of cramping and tightening abdominal pain since Monday following a cervical check. She is unable to quantify how frequent the pains/contractions are. She denies VB, LOF and endorses good fetal movement.      Review of patient's allergies indicates:   Allergen Reactions    Gluten protein Anxiety, Diarrhea, Itching, Other (See Comments) and Swelling     Abdominal pain        Past Medical History:   Diagnosis Date    Pneumonia     Varicella      Past Surgical History:   Procedure Laterality Date    ESOPHAGUS SURGERY      Repair as an infant     Family History   Problem Relation Name Age of Onset    Breast cancer Maternal Grandmother      Breast cancer Paternal Grandmother      Colon cancer Neg Hx      Ovarian cancer Neg Hx       Social History     Tobacco Use    Smoking status: Never     Passive exposure: Yes    Smokeless tobacco: Never    Tobacco comments:     dad smokes outside   Substance Use Topics    Alcohol use: No    Drug use: No     Review of Systems   Constitutional:  Negative for chills and fever.   HENT:  Negative for congestion and sore throat.    Eyes:  Negative for visual disturbance.   Respiratory:  Negative for shortness of breath.    Cardiovascular:  Negative for chest pain.   Gastrointestinal:  Positive for abdominal pain. Negative for nausea and vomiting.   Genitourinary:  Positive for pelvic pain. Negative for dysuria and vaginal bleeding.   Neurological:  Negative for syncope and headaches.       Physical Exam     Initial Vitals   BP Pulse Resp Temp SpO2   24 1156 24 1207 24 1211 24 1211 24 1211   123/67 91 20 98 °F (36.7 °C) 97 %      MAP       --                Physical Exam    Nursing note and vitals reviewed.  Constitutional: She appears well-developed and well-nourished. She is not diaphoretic. No distress.   HENT:   Head:  Normocephalic and atraumatic.   Eyes: EOM are normal.   Neck:   Normal range of motion.  Cardiovascular:  Normal rate.           Pulmonary/Chest: No respiratory distress.   Abdominal: There is no abdominal tenderness.   Gravid abdomen There is no rebound and no guarding.   Musculoskeletal:         General: No tenderness or edema. Normal range of motion.      Cervical back: Normal range of motion.     Neurological: She is alert and oriented to person, place, and time.   Skin: Skin is warm and dry.   Psychiatric: She has a normal mood and affect. Her behavior is normal.     OB LABOR EXAM:             Dilatation: 2.   Station: -3.   Effacement: 70%.       Comments: Cervical exam stable over 3 hours       ED Course   Fetal non-stress test    Date/Time: 2024 12:40 PM    Performed by: Shivani Horvath MD  Authorized by: Sonam Peters MD    Nonstress Test:     Variability:  6-25 BPM    Decelerations:  None    Accelerations:  15 bpm    Baseline:  130    Contractions:  Regular    Contraction Frequency:  Q2-6 min  Biophysical Profile:     Nonstress Test Interpretation: reactive      Overall Impression:  Reassuring  Post-procedure:     Patient tolerance:  Patient tolerated the procedure well with no immediate complications    Labs Reviewed - No data to display       Imaging Results    None          Medications - No data to display    Medical Decision Making  Brenda Menendez is a 18 y.o. F at 36w5d who presents complaining of contractions.    Pulse:  [73-79] 79  SpO2:  [100 %] 100 %  BP: (109)/(72) 109/72    NST: reactive and reassuring  [place]: ctx q2-6 mins  SVE: 2/70/-3, unchanged over 3 hours    Discussed latent labor with patient. Patient comfortable discharging home and returning when contractions are more intense. Recommended warm bath for contraction pain and Tylenol as needed. Given return precautions for contractions increasing in intensity or LOF along with additional labor precautions. Patient stable  for discharge at this time. She voiced understanding and is in agreement with the plan.    Shivani Horvath MD  OB/GYN PGY-1    Risk  OTC drugs.              Attending Attestation:   Physician Attestation Statement for Resident:  As the supervising MD   Physician Attestation Statement: I have personally seen and examined this patient.   I agree with the above history.  -:   As the supervising MD I agree with the above PE.     As the supervising MD I agree with the above treatment, course, plan, and disposition.   I was personally present during the critical portions of the procedure(s) performed by the resident and was immediately available in the ED to provide services and assistance as needed during the entire procedure.  I have reviewed and agree with the residents interpretation of the following: lab data.  I have reviewed the following: old records at this facility.            Attending ED Notes:   I saw and examined the patient myself along with the resident. I have personally reviewed pertinent prior records, labs, imaging, and procedures. I have reviewed the documentation and agree with the findings and plan as documented.      at 36w5d presenting with contractions. Regular contractions on tocometry, however, cervix unchanged at 2/70/-3 over 3 hours. NST reactive and reassuring. Counseled regarding latent labor, supportive care measures, return precautions. Discharge home in stable condition.    Sonam Peters MD FACOG  OB Hospitalist                                 Clinical Impression:  Final diagnoses:  [O47.00]  uterine contractions (Primary)  [Z3A.36] 36 weeks gestation of pregnancy          ED Disposition Condition    Discharge Stable          ED Prescriptions    None       Follow-up Information       Follow up With Specialties Details Why Contact Info    [facility] - Emergency Dept (Obstetrics) Emergency Medicine  If symptoms worsen [address] [facility]  [address]  [phone]    Carmen Duenas MD Obstetrics and Gynecology  As needed, As scheduled [address] [address]  [phone]               Shivani Horvath MD  Resident  11/07/24 1557       Sonam Peters MD  11/09/24 [id number]

## 2024-11-06 NOTE — DISCHARGE INSTRUCTIONS
Contact your primary OB or after hours at [phone] if you experience any of the following:    Contractions every 3-5 minutes for 2 or more hours.   A sudden gush or constant leaking of fluid.  Heavy vaginal bleeding.   If you're not feeling your baby move as much or not at all.  If you experience a constant headache, blurry vision, pain underneath your right rib, or sudden swelling of your hands, feet, and face.     Remember to stay hydrated; drink 8-10 bottles of water a day.     Maintain all follow-up appointments.

## 2024-11-07 ENCOUNTER — PATIENT MESSAGE (OUTPATIENT)
Dept: OBSTETRICS AND GYNECOLOGY | Facility: CLINIC | Age: 19
End: 2024-11-07
Payer: MEDICAID

## 2024-11-07 LAB — BACTERIA SPEC AEROBE CULT: NORMAL

## 2024-11-08 ENCOUNTER — HOSPITAL ENCOUNTER (EMERGENCY)
Facility: OTHER | Age: 19
Discharge: HOME | End: 2024-11-08
Attending: OBSTETRICS & GYNECOLOGY
Payer: MEDICAID

## 2024-11-08 ENCOUNTER — TELEPHONE (OUTPATIENT)
Dept: OBSTETRICS AND GYNECOLOGY | Facility: OTHER | Age: 19
End: 2024-11-08
Payer: MEDICAID

## 2024-11-08 VITALS
RESPIRATION RATE: 17 BRPM | HEART RATE: 82 BPM | SYSTOLIC BLOOD PRESSURE: 112 MMHG | DIASTOLIC BLOOD PRESSURE: 78 MMHG | OXYGEN SATURATION: 100 % | TEMPERATURE: 98 F

## 2024-11-08 DIAGNOSIS — Z3A.37 37 WEEKS GESTATION OF PREGNANCY: ICD-10-CM

## 2024-11-08 DIAGNOSIS — O47.9 UTERINE CONTRACTIONS: Primary | ICD-10-CM

## 2024-11-08 PROCEDURE — 59025 FETAL NON-STRESS TEST: CPT | Mod: 26,,, | Performed by: OBSTETRICS & GYNECOLOGY

## 2024-11-08 PROCEDURE — 59025 FETAL NON-STRESS TEST: CPT

## 2024-11-08 PROCEDURE — 99283 EMERGENCY DEPT VISIT LOW MDM: CPT | Mod: ,,, | Performed by: OBSTETRICS & GYNECOLOGY

## 2024-11-08 PROCEDURE — 25000003 PHARM REV CODE 250

## 2024-11-08 PROCEDURE — 99284 EMERGENCY DEPT VISIT MOD MDM: CPT | Mod: 25

## 2024-11-08 RX ORDER — OXYCODONE HYDROCHLORIDE 10 MG/1
10 TABLET ORAL ONCE
Status: COMPLETED | OUTPATIENT
Start: 2024-11-08 | End: 2024-11-08

## 2024-11-08 RX ADMIN — OXYCODONE HYDROCHLORIDE 10 MG: 10 TABLET ORAL at 12:11

## 2024-11-08 NOTE — ED PROVIDER NOTES
Encounter Date: 2024       History     Chief Complaint   Patient presents with    Abdominal Pain     Brenda Menendez is a 18 y.o. F at 37w0d who presents complaining of contractions. Patient was seen two days ago in the OB ED. She reports contractions have continued since then and become more painful. She denies vaginal bleeding, LOF and reports good FM. She has no further complaints at this time.     This IUP is complicated by h/o esophageal surgery.      Review of patient's allergies indicates:   Allergen Reactions    Gluten protein Anxiety, Diarrhea, Itching, Other (See Comments) and Swelling     Abdominal pain        Past Medical History:   Diagnosis Date    Pneumonia     Varicella      Past Surgical History:   Procedure Laterality Date    ESOPHAGUS SURGERY      Repair as an infant     Family History   Problem Relation Name Age of Onset    Breast cancer Maternal Grandmother      Breast cancer Paternal Grandmother      Colon cancer Neg Hx      Ovarian cancer Neg Hx       Social History     Tobacco Use    Smoking status: Never     Passive exposure: Yes    Smokeless tobacco: Never    Tobacco comments:     dad smokes outside   Substance Use Topics    Alcohol use: No    Drug use: No     Review of Systems   Constitutional:  Negative for chills and fever.   HENT:  Negative for sore throat.    Eyes:  Negative for visual disturbance.   Respiratory:  Negative for shortness of breath.    Cardiovascular:  Negative for chest pain.   Gastrointestinal:  Positive for nausea.   Genitourinary:  Negative for dysuria and vaginal bleeding.   Neurological:  Negative for seizures and weakness.       Physical Exam     Initial Vitals   BP Pulse Resp Temp SpO2   24 1051 24 1051 24 1051 24 1051 24 1109   133/78 93 19 98.2 °F (36.8 °C) 97 %      MAP       --                Physical Exam    Nursing note and vitals reviewed.  Constitutional: She appears well-developed and well-nourished. She is not  diaphoretic. She appears distressed (tearful, appears uncomfortable).   HENT:   Head: Normocephalic and atraumatic.   Eyes: EOM are normal.   Neck:   Normal range of motion.  Cardiovascular:  Normal rate.           Pulmonary/Chest: No respiratory distress.   Abdominal: There is no abdominal tenderness. There is no rebound and no guarding.   Musculoskeletal:         General: No tenderness or edema. Normal range of motion.      Cervical back: Normal range of motion.     Neurological: She is alert and oriented to person, place, and time.   Skin: Skin is warm and dry. No rash noted.   Psychiatric: She has a normal mood and affect. Her behavior is normal.     OB LABOR EXAM:             Dilatation: 3.   Station: -2.   Effacement: 80%.             ED Course   Obtain Fetal nonstress test (NST)    Date/Time: 2024 1:56 PM    Performed by: Kim Andre MD  Authorized by: Shivani Horvath MD    Nonstress Test:     Variability:  6-25 BPM    Decelerations:  None    Accelerations:  15 bpm    Baseline:  120    Contractions:  Irregular    Contraction Frequency:  2 minutes spaced to occasional  Biophysical Profile:     Nonstress Test Interpretation: reactive      Overall Impression:  Reassuring    Labs Reviewed - No data to display       Imaging Results    None          Medications   oxyCODONE immediate release tablet Tab 10 mg (10 mg Oral Given 24 1212)     Medical Decision Making  Brenda Menendez is a 18 y.o. F at 37w0d who presents complaining of contractions.    Temp:  [98.2 °F (36.8 °C)] 98.2 °F (36.8 °C)  Pulse:  [93] 93  Resp:  [19] 19  BP: (133)/(78) 133/78    Oxycodone 10 mg  PO hydration    Risk  Prescription drug management.              Attending Attestation:   Physician Attestation Statement for Resident:  As the supervising MD   Physician Attestation Statement: I have personally seen and examined this patient.   I agree with the above history.  -:   As the supervising MD I agree with the above  PE.     As the supervising MD I agree with the above treatment, course, plan, and disposition.   -:     NST reactive and reassuring, toco with contractions every 3 minutes now rare.  Exam not c/w labor.  3/60/-2 on recheck.  Patient reports pain has improved with po hydration and oxycodone.  Will discharge to home.  F/U with OB in 1 week.    Kim Andre MD,STEPHANIE  Date and Time: 11/08/2024 2:17 PM  OB Hospitalist    I was personally present during the critical portions of the procedure(s) performed by the resident and was immediately available in the ED to provide services and assistance as needed during the entire procedure.   I have reviewed the following: old records at this facility.                ED Course as of 11/08/24 1417 Fri Nov 08, 2024   1225 Patient given oxycodone 10mg due to pain.  Contractions improved after moving to her side. Will continue to monitor and recheck at 0115. [CD]   1358 Recheck cervix 3/60/-2 pos to mid position. [CD]      ED Course User Index  [CD] Kim Andre MD                             Clinical Impression:  Final diagnoses:  [O47.9] Uterine contractions (Primary)  [Z3A.37] 37 weeks gestation of pregnancy          ED Disposition Condition    Discharge Stable          ED Prescriptions    None       Follow-up Information    None          Kim Andre MD  11/08/24 1417

## 2024-11-08 NOTE — DISCHARGE INSTRUCTIONS
Call clinic [phone] or L & D after hours at [phone] for vaginal bleeding, leakage of fluids, regular contractions every 5 mins for 2 hours, decreased fetal movements ( 10 kicks in 2 hours), headache not relieved by Tylenol, blurry vision, or temp of 100.4 or greater.  Begin doing fetal kick counts, at least 10 movements in 2 hours starting at 28 weeks gestation.  Keep next clinic appointment

## 2024-11-14 ENCOUNTER — ROUTINE PRENATAL (OUTPATIENT)
Dept: OBSTETRICS AND GYNECOLOGY | Facility: CLINIC | Age: 19
End: 2024-11-14
Payer: MEDICAID

## 2024-11-14 VITALS
DIASTOLIC BLOOD PRESSURE: 80 MMHG | SYSTOLIC BLOOD PRESSURE: 118 MMHG | WEIGHT: 192.25 LBS | BODY MASS INDEX: 29.23 KG/M2

## 2024-11-14 DIAGNOSIS — Z3A.37 37 WEEKS GESTATION OF PREGNANCY: Primary | ICD-10-CM

## 2024-11-14 PROCEDURE — 99999 PR PBB SHADOW E&M-EST. PATIENT-LVL II: CPT | Mod: PBBFAC,,, | Performed by: STUDENT IN AN ORGANIZED HEALTH CARE EDUCATION/TRAINING PROGRAM

## 2024-11-14 PROCEDURE — 99212 OFFICE O/P EST SF 10 MIN: CPT | Mod: PBBFAC | Performed by: STUDENT IN AN ORGANIZED HEALTH CARE EDUCATION/TRAINING PROGRAM

## 2024-11-14 NOTE — PROGRESS NOTES
at 37w6d   Doing well overall, +FM, -LOF/VB/CTX.   PP immunizations reviewed. S/p OB ED visits for CTX. Cervix 3/80/-2, similar on exam today. S/p consents, 3TMS labs, and GBS. Pt's questions answered. RTC weekly or sooner PRN. [facility] ED precautions reviewed. Presents with mother.    I spent a total of 20 minutes on the day of the visit. This includes face to face time and non-face to face time preparing to see the patient (eg, review of tests with interpretation of results and explanation of results to patient), obtaining and/or reviewing separately obtained history, documenting clinical information, appropriate prenatal counseling, and care coordination.

## 2024-11-20 ENCOUNTER — TELEPHONE (OUTPATIENT)
Dept: OBSTETRICS AND GYNECOLOGY | Facility: CLINIC | Age: 19
End: 2024-11-20
Payer: MEDICAID

## 2024-11-20 ENCOUNTER — ROUTINE PRENATAL (OUTPATIENT)
Dept: OBSTETRICS AND GYNECOLOGY | Facility: CLINIC | Age: 19
End: 2024-11-20
Payer: MEDICAID

## 2024-11-20 VITALS
DIASTOLIC BLOOD PRESSURE: 80 MMHG | BODY MASS INDEX: 29.77 KG/M2 | SYSTOLIC BLOOD PRESSURE: 120 MMHG | WEIGHT: 195.75 LBS

## 2024-11-20 DIAGNOSIS — Z34.90 ENCOUNTER FOR ELECTIVE INDUCTION OF LABOR: Primary | ICD-10-CM

## 2024-11-20 DIAGNOSIS — Z3A.38 38 WEEKS GESTATION OF PREGNANCY: Primary | ICD-10-CM

## 2024-11-20 PROCEDURE — 99213 OFFICE O/P EST LOW 20 MIN: CPT | Mod: TH,S$PBB,, | Performed by: STUDENT IN AN ORGANIZED HEALTH CARE EDUCATION/TRAINING PROGRAM

## 2024-11-20 PROCEDURE — 99999 PR PBB SHADOW E&M-EST. PATIENT-LVL II: CPT | Mod: PBBFAC,,, | Performed by: STUDENT IN AN ORGANIZED HEALTH CARE EDUCATION/TRAINING PROGRAM

## 2024-11-20 PROCEDURE — 99212 OFFICE O/P EST SF 10 MIN: CPT | Mod: PBBFAC | Performed by: STUDENT IN AN ORGANIZED HEALTH CARE EDUCATION/TRAINING PROGRAM

## 2024-11-20 NOTE — PROGRESS NOTES
at 38w5d   Doing well overall, +FM, -LOF/VB, occasional CTX.   Delivery process, hospitalist/resident team reviewed. Requests term induction, will request for MN on Monday, Dec 2. Pt's questions answered. RTC weekly or sooner PRN. [facility] ED precautions reviewed.       I spent a total of 20 minutes on the day of the visit. This includes face to face time and non-face to face time preparing to see the patient (eg, review of tests with interpretation of results and explanation of results to patient), obtaining and/or reviewing separately obtained history, documenting clinical information, appropriate prenatal counseling, and care coordination.

## 2024-11-20 NOTE — TELEPHONE ENCOUNTER
Trini HERNANDEZ, there were actually 3 requests in the queue.  But I put in the request any way in case it was available. Please confirm if you want to leave as is or change.  Another availability as of now is 12/1 at 1600.  Let me know!

## 2024-11-20 NOTE — TELEPHONE ENCOUNTER
----- Message from Carmen Duenas MD sent at 11/20/2024 10:05 AM CST -----  Induction request!  Spot looked avaialble on snapboard    17yo P0 who will be 40w3d on Monday, Dec 2, at midnight; term induction

## 2024-11-22 ENCOUNTER — PATIENT MESSAGE (OUTPATIENT)
Dept: OBSTETRICS AND GYNECOLOGY | Facility: CLINIC | Age: 19
End: 2024-11-22
Payer: MEDICAID

## 2024-11-24 ENCOUNTER — HOSPITAL ENCOUNTER (INPATIENT)
Facility: OTHER | Age: 19
LOS: 2 days | Discharge: HOME | End: 2024-11-26
Attending: OBSTETRICS & GYNECOLOGY | Admitting: OBSTETRICS & GYNECOLOGY
Payer: MEDICAID

## 2024-11-24 ENCOUNTER — ANESTHESIA EVENT (OUTPATIENT)
Dept: ANESTHESIOLOGY | Facility: HOSPITAL | Age: 19
End: 2024-11-24
Payer: MEDICAID

## 2024-11-24 ENCOUNTER — ANESTHESIA (OUTPATIENT)
Dept: ANESTHESIOLOGY | Facility: HOSPITAL | Age: 19
End: 2024-11-24
Payer: MEDICAID

## 2024-11-24 DIAGNOSIS — Z3A.39 39 WEEKS GESTATION OF PREGNANCY: ICD-10-CM

## 2024-11-24 DIAGNOSIS — O42.90 PREMATURE RUPTURE OF MEMBRANES, UNSPECIFIED DURATION TO ONSET OF LABOR, UNSPECIFIED GESTATIONAL AGE: ICD-10-CM

## 2024-11-24 DIAGNOSIS — O42.90 PROM (PREMATURE RUPTURE OF MEMBRANES): ICD-10-CM

## 2024-11-24 LAB
ABO + RH BLD: NORMAL
BASOPHILS # BLD AUTO: 0.05 K/UL (ref 0–0.2)
BASOPHILS NFR BLD: 0.4 % (ref 0–1.9)
BLD GP AB SCN CELLS X3 SERPL QL: NORMAL
DIFFERENTIAL METHOD BLD: ABNORMAL
EOSINOPHIL # BLD AUTO: 0.1 K/UL (ref 0–0.5)
EOSINOPHIL NFR BLD: 0.5 % (ref 0–8)
ERYTHROCYTE [DISTWIDTH] IN BLOOD BY AUTOMATED COUNT: 16.2 % (ref 11.5–14.5)
HCT VFR BLD AUTO: 32.1 % (ref 37–48.5)
HGB BLD-MCNC: 10.4 G/DL (ref 12–16)
IMM GRANULOCYTES # BLD AUTO: 0.22 K/UL (ref 0–0.04)
IMM GRANULOCYTES NFR BLD AUTO: 1.6 % (ref 0–0.5)
LYMPHOCYTES # BLD AUTO: 2.5 K/UL (ref 1–4.8)
LYMPHOCYTES NFR BLD: 18.5 % (ref 18–48)
MCH RBC QN AUTO: 25.4 PG (ref 27–31)
MCHC RBC AUTO-ENTMCNC: 32.4 G/DL (ref 32–36)
MCV RBC AUTO: 79 FL (ref 82–98)
MONOCYTES # BLD AUTO: 1.4 K/UL (ref 0.3–1)
MONOCYTES NFR BLD: 10.1 % (ref 4–15)
NEUTROPHILS # BLD AUTO: 9.3 K/UL (ref 1.8–7.7)
NEUTROPHILS NFR BLD: 68.9 % (ref 38–73)
NRBC BLD-RTO: 0 /100 WBC
PLATELET # BLD AUTO: 263 K/UL (ref 150–450)
PMV BLD AUTO: 11.8 FL (ref 9.2–12.9)
RBC # BLD AUTO: 4.09 M/UL (ref 4–5.4)
TREPONEMA PALLIDUM IGG+IGM AB [PRESENCE] IN SERUM OR PLASMA BY IMMUNOASSAY: NONREACTIVE
WBC # BLD AUTO: 13.54 K/UL (ref 3.9–12.7)

## 2024-11-24 PROCEDURE — 86901 BLOOD TYPING SEROLOGIC RH(D): CPT

## 2024-11-24 PROCEDURE — 59409 OBSTETRICAL CARE: CPT | Mod: GB,,, | Performed by: OBSTETRICS & GYNECOLOGY

## 2024-11-24 PROCEDURE — 51702 INSERT TEMP BLADDER CATH: CPT

## 2024-11-24 PROCEDURE — 25000003 PHARM REV CODE 250: Performed by: ANESTHESIOLOGY

## 2024-11-24 PROCEDURE — 85025 COMPLETE CBC W/AUTO DIFF WBC: CPT

## 2024-11-24 PROCEDURE — 86593 SYPHILIS TEST NON-TREP QUANT: CPT

## 2024-11-24 PROCEDURE — 0KQM0ZZ REPAIR PERINEUM MUSCLE, OPEN APPROACH: ICD-10-PCS | Performed by: OBSTETRICS & GYNECOLOGY

## 2024-11-24 PROCEDURE — 63600175 PHARM REV CODE 636 W HCPCS: Performed by: ANESTHESIOLOGY

## 2024-11-24 PROCEDURE — 62326 NJX INTERLAMINAR LMBR/SAC: CPT | Performed by: ANESTHESIOLOGY

## 2024-11-24 PROCEDURE — 76815 OB US LIMITED FETUS(S): CPT | Mod: 26,,, | Performed by: OBSTETRICS & GYNECOLOGY

## 2024-11-24 PROCEDURE — 27000181 HC CABLE, IUPC

## 2024-11-24 PROCEDURE — 99285 EMERGENCY DEPT VISIT HI MDM: CPT | Mod: ,,, | Performed by: OBSTETRICS & GYNECOLOGY

## 2024-11-24 PROCEDURE — 59409 OBSTETRICAL CARE: CPT | Mod: AA,,, | Performed by: ANESTHESIOLOGY

## 2024-11-24 PROCEDURE — 25000003 PHARM REV CODE 250

## 2024-11-24 PROCEDURE — 63600175 PHARM REV CODE 636 W HCPCS

## 2024-11-24 PROCEDURE — 4A1HXCZ MONITORING OF PRODUCTS OF CONCEPTION, CARDIAC RATE, EXTERNAL APPROACH: ICD-10-PCS | Performed by: OBSTETRICS & GYNECOLOGY

## 2024-11-24 PROCEDURE — C1751 CATH, INF, PER/CENT/MIDLINE: HCPCS | Performed by: ANESTHESIOLOGY

## 2024-11-24 PROCEDURE — 27200710 HC EPIDURAL INFUSION PUMP SET: Performed by: ANESTHESIOLOGY

## 2024-11-24 PROCEDURE — 59025 FETAL NON-STRESS TEST: CPT | Mod: 26,,, | Performed by: OBSTETRICS & GYNECOLOGY

## 2024-11-24 PROCEDURE — 11000001 HC ACUTE MED/SURG PRIVATE ROOM

## 2024-11-24 RX ORDER — MISOPROSTOL 200 UG/1
800 TABLET ORAL ONCE AS NEEDED
Status: COMPLETED | OUTPATIENT
Start: 2024-11-24 | End: 2024-11-24

## 2024-11-24 RX ORDER — METHYLERGONOVINE MALEATE 0.2 MG/ML
200 INJECTION INTRAVENOUS ONCE AS NEEDED
Status: DISCONTINUED | OUTPATIENT
Start: 2024-11-24 | End: 2024-11-25

## 2024-11-24 RX ORDER — TRANEXAMIC ACID 10 MG/ML
1000 INJECTION, SOLUTION INTRAVENOUS EVERY 30 MIN PRN
Status: DISCONTINUED | OUTPATIENT
Start: 2024-11-24 | End: 2024-11-26 | Stop reason: HOSPADM

## 2024-11-24 RX ORDER — CALCIUM CARBONATE 200(500)MG
500 TABLET,CHEWABLE ORAL 3 TIMES DAILY PRN
Status: DISCONTINUED | OUTPATIENT
Start: 2024-11-24 | End: 2024-11-25

## 2024-11-24 RX ORDER — ONDANSETRON 8 MG/1
8 TABLET, ORALLY DISINTEGRATING ORAL EVERY 8 HOURS PRN
Status: DISCONTINUED | OUTPATIENT
Start: 2024-11-25 | End: 2024-11-26 | Stop reason: HOSPADM

## 2024-11-24 RX ORDER — METHYLERGONOVINE MALEATE 0.2 MG/ML
200 INJECTION INTRAVENOUS ONCE AS NEEDED
Status: DISCONTINUED | OUTPATIENT
Start: 2024-11-25 | End: 2024-11-26 | Stop reason: HOSPADM

## 2024-11-24 RX ORDER — CARBOPROST TROMETHAMINE 250 UG/ML
250 INJECTION, SOLUTION INTRAMUSCULAR
Status: DISCONTINUED | OUTPATIENT
Start: 2024-11-24 | End: 2024-11-25

## 2024-11-24 RX ORDER — SODIUM CHLORIDE, SODIUM LACTATE, POTASSIUM CHLORIDE, CALCIUM CHLORIDE 600; 310; 30; 20 MG/100ML; MG/100ML; MG/100ML; MG/100ML
INJECTION, SOLUTION INTRAVENOUS CONTINUOUS
Status: DISCONTINUED | OUTPATIENT
Start: 2024-11-24 | End: 2024-11-25

## 2024-11-24 RX ORDER — MISOPROSTOL 200 UG/1
800 TABLET ORAL ONCE AS NEEDED
Status: DISCONTINUED | OUTPATIENT
Start: 2024-11-24 | End: 2024-11-25

## 2024-11-24 RX ORDER — OXYTOCIN-SODIUM CHLORIDE 0.9% IV SOLN 30 UNIT/500ML 30-0.9/5 UT/ML-%
95 SOLUTION INTRAVENOUS ONCE AS NEEDED
Status: DISCONTINUED | OUTPATIENT
Start: 2024-11-24 | End: 2024-11-26 | Stop reason: HOSPADM

## 2024-11-24 RX ORDER — MISOPROSTOL 200 UG/1
800 TABLET ORAL ONCE AS NEEDED
Status: DISCONTINUED | OUTPATIENT
Start: 2024-11-25 | End: 2024-11-26 | Stop reason: HOSPADM

## 2024-11-24 RX ORDER — OXYTOCIN 10 [USP'U]/ML
10 INJECTION, SOLUTION INTRAMUSCULAR; INTRAVENOUS ONCE AS NEEDED
Status: DISCONTINUED | OUTPATIENT
Start: 2024-11-24 | End: 2024-11-25

## 2024-11-24 RX ORDER — DIPHENOXYLATE HYDROCHLORIDE AND ATROPINE SULFATE 2.5; .025 MG/1; MG/1
2 TABLET ORAL EVERY 6 HOURS PRN
Status: DISCONTINUED | OUTPATIENT
Start: 2024-11-24 | End: 2024-11-25

## 2024-11-24 RX ORDER — TERBUTALINE SULFATE 1 MG/ML
0.25 INJECTION SUBCUTANEOUS ONCE
Status: DISCONTINUED | OUTPATIENT
Start: 2024-11-24 | End: 2024-11-25

## 2024-11-24 RX ORDER — SIMETHICONE 80 MG
1 TABLET,CHEWABLE ORAL 4 TIMES DAILY PRN
Status: DISCONTINUED | OUTPATIENT
Start: 2024-11-24 | End: 2024-11-25

## 2024-11-24 RX ORDER — MISOPROSTOL 200 UG/1
800 TABLET ORAL ONCE AS NEEDED
Status: DISCONTINUED | OUTPATIENT
Start: 2024-11-24 | End: 2024-11-26 | Stop reason: HOSPADM

## 2024-11-24 RX ORDER — ONDANSETRON 8 MG/1
8 TABLET, ORALLY DISINTEGRATING ORAL EVERY 8 HOURS PRN
Status: DISCONTINUED | OUTPATIENT
Start: 2024-11-24 | End: 2024-11-25

## 2024-11-24 RX ORDER — OXYTOCIN-SODIUM CHLORIDE 0.9% IV SOLN 30 UNIT/500ML 30-0.9/5 UT/ML-%
10 SOLUTION INTRAVENOUS ONCE AS NEEDED
Status: DISCONTINUED | OUTPATIENT
Start: 2024-11-24 | End: 2024-11-25

## 2024-11-24 RX ORDER — FENTANYL/BUPIVACAINE/NS/PF 2MCG/ML-.1
PLASTIC BAG, INJECTION (ML) INJECTION CONTINUOUS
OUTPATIENT
Start: 2024-11-24

## 2024-11-24 RX ORDER — SODIUM CHLORIDE 0.9 % (FLUSH) 0.9 %
10 SYRINGE (ML) INJECTION
Status: DISCONTINUED | OUTPATIENT
Start: 2024-11-25 | End: 2024-11-26 | Stop reason: HOSPADM

## 2024-11-24 RX ORDER — LIDOCAINE HYDROCHLORIDE 10 MG/ML
10 INJECTION, SOLUTION INFILTRATION; PERINEURAL ONCE AS NEEDED
Status: DISCONTINUED | OUTPATIENT
Start: 2024-11-24 | End: 2024-11-25

## 2024-11-24 RX ORDER — OXYTOCIN-SODIUM CHLORIDE 0.9% IV SOLN 30 UNIT/500ML 30-0.9/5 UT/ML-%
95 SOLUTION INTRAVENOUS ONCE AS NEEDED
Status: DISCONTINUED | OUTPATIENT
Start: 2024-11-24 | End: 2024-11-25

## 2024-11-24 RX ORDER — CEFAZOLIN 2 G/1
2 INJECTION, POWDER, FOR SOLUTION INTRAMUSCULAR; INTRAVENOUS ONCE AS NEEDED
Status: DISCONTINUED | OUTPATIENT
Start: 2024-11-24 | End: 2024-11-25

## 2024-11-24 RX ORDER — OXYTOCIN 10 [USP'U]/ML
10 INJECTION, SOLUTION INTRAMUSCULAR; INTRAVENOUS ONCE AS NEEDED
Status: DISCONTINUED | OUTPATIENT
Start: 2024-11-25 | End: 2024-11-26 | Stop reason: HOSPADM

## 2024-11-24 RX ORDER — SODIUM CHLORIDE 9 MG/ML
INJECTION, SOLUTION INTRAVENOUS
Status: DISCONTINUED | OUTPATIENT
Start: 2024-11-24 | End: 2024-11-25

## 2024-11-24 RX ORDER — TRANEXAMIC ACID 10 MG/ML
1000 INJECTION, SOLUTION INTRAVENOUS EVERY 30 MIN PRN
Status: DISCONTINUED | OUTPATIENT
Start: 2024-11-24 | End: 2024-11-25

## 2024-11-24 RX ORDER — DIPHENOXYLATE HYDROCHLORIDE AND ATROPINE SULFATE 2.5; .025 MG/1; MG/1
2 TABLET ORAL EVERY 6 HOURS PRN
Status: DISCONTINUED | OUTPATIENT
Start: 2024-11-25 | End: 2024-11-26 | Stop reason: HOSPADM

## 2024-11-24 RX ORDER — ACETAMINOPHEN 325 MG/1
650 TABLET ORAL EVERY 6 HOURS PRN
Status: DISCONTINUED | OUTPATIENT
Start: 2024-11-24 | End: 2024-11-25

## 2024-11-24 RX ORDER — OXYTOCIN-SODIUM CHLORIDE 0.9% IV SOLN 30 UNIT/500ML 30-0.9/5 UT/ML-%
10 SOLUTION INTRAVENOUS ONCE AS NEEDED
Status: COMPLETED | OUTPATIENT
Start: 2024-11-24 | End: 2024-11-25

## 2024-11-24 RX ORDER — OXYTOCIN-SODIUM CHLORIDE 0.9% IV SOLN 30 UNIT/500ML 30-0.9/5 UT/ML-%
0-32 SOLUTION INTRAVENOUS CONTINUOUS
Status: DISCONTINUED | OUTPATIENT
Start: 2024-11-24 | End: 2024-11-25

## 2024-11-24 RX ORDER — SERTRALINE HYDROCHLORIDE 25 MG/1
25 TABLET, FILM COATED ORAL DAILY
Status: DISCONTINUED | OUTPATIENT
Start: 2024-11-24 | End: 2024-11-26 | Stop reason: HOSPADM

## 2024-11-24 RX ORDER — CARBOPROST TROMETHAMINE 250 UG/ML
250 INJECTION, SOLUTION INTRAMUSCULAR
Status: DISCONTINUED | OUTPATIENT
Start: 2024-11-25 | End: 2024-11-26 | Stop reason: HOSPADM

## 2024-11-24 RX ORDER — FENTANYL/BUPIVACAINE/NS/PF 2MCG/ML-.1
PLASTIC BAG, INJECTION (ML) INJECTION
Status: DISPENSED
Start: 2024-11-24 | End: 2024-11-25

## 2024-11-24 RX ORDER — OXYTOCIN-SODIUM CHLORIDE 0.9% IV SOLN 30 UNIT/500ML 30-0.9/5 UT/ML-%
10 SOLUTION INTRAVENOUS ONCE AS NEEDED
Status: DISCONTINUED | OUTPATIENT
Start: 2024-11-25 | End: 2024-11-26 | Stop reason: HOSPADM

## 2024-11-24 RX ADMIN — FENTANYL CITRATE 10 ML/HR: 50 INJECTION, SOLUTION INTRAMUSCULAR; INTRAVENOUS at 06:11

## 2024-11-24 RX ADMIN — OXYTOCIN-SODIUM CHLORIDE 0.9% IV SOLN 30 UNIT/500ML 10 UNITS: 30-0.9/5 SOLUTION at 11:11

## 2024-11-24 RX ADMIN — MISOPROSTOL 800 MCG: 200 TABLET ORAL at 11:11

## 2024-11-24 RX ADMIN — Medication 4 MILLI-UNITS/MIN: at 02:11

## 2024-11-24 RX ADMIN — LIDOCAINE HYDROCHLORIDE,EPINEPHRINE BITARTRATE 3 ML: 15; .005 INJECTION, SOLUTION EPIDURAL; INFILTRATION; INTRACAUDAL; PERINEURAL at 06:11

## 2024-11-24 NOTE — PROGRESS NOTES
LABOR NOTE    S:  Complaints: No.  Epidural Working:  not applicable    O: /86   Pulse 98   Temp 97.7 °F (36.5 °C) (Oral)   Resp 20   LMP 02/26/2024   SpO2 97%   Breastfeeding No     FHT: 120 bpm, mod variability, +accels, no decels Cat 1 (reassuring)  CTX: q 1-3 minutes, pit @ 12  SVE: 4/80/-2    TIMELINE:   1030: SROM clear  1300: 3/50/-3  1645: 4/80/-2, pit @ 12    PLAN:    Continue Close Maternal/Fetal Monitoring  Pitocin Augmentation per protocol  Recheck 2-4 hours or PRN    Shivani Horvath MD  OB/GYN PGY-1

## 2024-11-24 NOTE — H&P
HISTORY AND PHYSICAL                                                OBSTETRICS          Subjective:       Brenda Menendez is a 18 y.o.  female with IUP at 39w2d weeks gestation who presents with PROM.    Patient denies contractions, denies vaginal bleeding, reports LOF.   Fetal Movement: normal.    This IUP is complicated by Maternal TEF, Anxiety/Depression .    Review of Systems   Constitutional:  Negative for chills and fever.   Eyes:  Negative for visual disturbance.   Respiratory:  Negative for shortness of breath.    Cardiovascular:  Negative for chest pain.   Gastrointestinal:  Negative for nausea and vomiting.   Genitourinary:  Positive for vaginal discharge. Negative for pelvic pain and vaginal bleeding.   Neurological:  Negative for headaches.       PMHx:   Past Medical History:   Diagnosis Date    Pneumonia     Varicella        PSHx:   Past Surgical History:   Procedure Laterality Date    ESOPHAGUS SURGERY      Repair as an infant       All:   Review of patient's allergies indicates:   Allergen Reactions    Gluten protein Anxiety, Diarrhea, Itching, Other (See Comments) and Swelling     Abdominal pain          Meds:   Medications Prior to Admission   Medication Sig Dispense Refill Last Dose/Taking    ondansetron (ZOFRAN-ODT) 4 MG TbDL Take 1 tablet (4 mg total) by mouth every 6 (six) hours as needed (Nausea). 10 tablet 0     prenatal 87-iron bis-folic-dha 32 mg iron- 1,000 mcg-230mg Cmpk Take 1 tablet by mouth once daily.       sertraline (ZOLOFT) 25 MG tablet Take 1 tablet (25 mg total) by mouth once daily. 30 tablet 11        SH:   Social History     Socioeconomic History    Marital status: Single   Tobacco Use    Smoking status: Never     Passive exposure: Yes    Smokeless tobacco: Never    Tobacco comments:     dad smokes outside   Substance and Sexual Activity    Alcohol use: No    Drug use: No    Sexual activity: Yes     Partners: Male   Social History Narrative    Lives with both parents and two  sisters    Attends [employer] 3rd grade    1 dog and fish     Social Drivers of Health     Financial Resource Strain: Low Risk  (2024)    Overall Financial Resource Strain (CARDIA)     Difficulty of Paying Living Expenses: Not hard at all   Food Insecurity: Unknown (2024)    Hunger Vital Sign     Worried About Running Out of Food in the Last Year: Never true   Transportation Needs: No Transportation Needs (2024)    TRANSPORTATION NEEDS     Transportation : No   Physical Activity: Insufficiently Active (2021)    Received from [email or web address] [email or web address], [facility] [email or web address]    Exercise Vital Sign     Days of Exercise per Week: 3 days     Minutes of Exercise per Session: 30 min   Stress: No Stress Concern Present (2021)    Received from [facility] [employer] [facility] [email or web address]    [place] [place] [employer] - Occupational Stress Questionnaire     Feeling of Stress : Not at all   Housing Stability: Low Risk  (2024)    Housing Stability Vital Sign     Unable to Pay for Housing in the Last Year: No     Homeless in the Last Year: No       FH:   Family History   Problem Relation Name Age of Onset    Breast cancer Maternal Grandmother      Breast cancer Paternal Grandmother      Colon cancer Neg Hx      Ovarian cancer Neg Hx         OBHx:   OB History    Para Term  AB Living   2 0 0 0 0 0   SAB IAB Ectopic Multiple Live Births   0 0 0 0 0      # Outcome Date GA Lbr Jones/2nd Weight Sex Type Anes PTL Lv   2 Current            1                 Objective:       /69   Pulse 83   Temp 97.7 °F (36.5 °C) (Oral)   Resp 17   LMP 2024   SpO2 98%   Breastfeeding No     Vitals:    24 1235 24 1250 24 1305   BP: 130/74 121/71 121/69   BP Location: Right arm     Patient Position: Lying     Pulse: 81 86 83   Resp: 17     Temp: 97.7 °F (36.5 °C)     TempSrc: Oral     SpO2: 99% 99% 98%       General:   alert, appears stated age and cooperative, no apparent distress   HENT:   normocephalic, atraumatic   Eyes:  extraocular movements and conjunctivae normal   Neck:  supple, range of motion normal, no thyromegaly   Lungs:   no respiratory distress   Heart:   regular rate   Abdomen:  soft, non-tender, non-distended but gravid, no rebound or guarding    Extremities negative edema, negative erythema   FHT: 140, moderate variability, +accels, -decels;  Cat 1 (reassuring)                 TOCO: Quiet   Presentations: cephalic by ultrasound   Cervix:     Dilation: 3cm    Effacement: 60    Station:  -3               EFW by Leopold's: 6.5        Lab Review  Blood Type A POS  GBBS: negative  Rubella: Immune  RPR: NR  HIV: negative  HepB: negative       Assessment:       39w2d weeks gestation with PROM.    Active Hospital Problems    Diagnosis  POA    *PROM (premature rupture of membranes) [O42.90]  Unknown      Resolved Hospital Problems   No resolved problems to display.          Plan:   1.PROM   Risks, benefits, alternatives and possible complications have been discussed in detail with the patient.   - Consents signed and to chart  - Admit to Labor and Delivery unit   - Epidural per Anesthesia  - Draw CBC, T&S  - Notify Staff  - Recheck in 4 hrs or PRN      Post-Partum Hemorrhage risk - low    2. Maternal TEF  -esophageal disorder s/p surgery as child   -anesthesia aware     3. Anxiety/Depression  -zoloft 25mg qd     Stacy Liu MD  Obstetrics & Gynecology, PGY-1

## 2024-11-24 NOTE — PROGRESS NOTES
11/24/24 1721   [facility] Note - Strip   Strip Reviewed by [facility] Nurse? Yes   [facility] Note - Communication   [place] Nurse Communicated with Bedside Nurse Regarding: Fetal Status

## 2024-11-24 NOTE — ANESTHESIA PREPROCEDURE EVALUATION
[facility]  Anesthesia Pre-Operative Evaluation         Patient Name: Brenda Menendez  YOB: 2005  MRN: [id number]    2024      Brenda Menendez is a 18 y.o. female  with IUP @ 39w2d who presents with PROM. IUP complicated by anxiety, depression, and maternal TEF s/p repair. Denies any active reflux symptoms.    Denies any history of cardiopulmonary disease, bleeding or clotting disorder, or spinal pathology.     OB History    Para Term  AB Living   2             SAB IAB Ectopic Multiple Live Births                  # Outcome Date GA Lbr Jones/2nd Weight Sex Type Anes PTL Lv   2 Current            1                 Review of patient's allergies indicates:   Allergen Reactions    Gluten protein Anxiety, Diarrhea, Itching, Other (See Comments) and Swelling     Abdominal pain          Wt Readings from Last 1 Encounters:   24 0947 88.8 kg (195 lb 12.3 oz)       BP Readings from Last 3 Encounters:   24 121/69   24 120/80   24 118/80       Patient Active Problem List   Diagnosis    Anxiety    Depression    Pain    Esophageal disorder    Nonintractable headache    PROM (premature rupture of membranes)       Past Surgical History:   Procedure Laterality Date    ESOPHAGUS SURGERY      Repair as an infant       Social History     Socioeconomic History    Marital status: Single   Tobacco Use    Smoking status: Never     Passive exposure: Yes    Smokeless tobacco: Never    Tobacco comments:     dad smokes outside   Substance and Sexual Activity    Alcohol use: No    Drug use: No    Sexual activity: Yes     Partners: Male   Social History Narrative    Lives with both parents and two sisters    Attends [employer] 3rd grade    1 dog and fish     Social Drivers of Health     Financial Resource Strain: Low Risk  (2024)    Overall Financial Resource Strain (CARDIA)     Difficulty of Paying Living Expenses: Not hard at all   Food Insecurity:  "Unknown (11/24/2024)    Hunger Vital Sign     Worried About Running Out of Food in the Last Year: Never true   Transportation Needs: No Transportation Needs (11/24/2024)    TRANSPORTATION NEEDS     Transportation : No   Physical Activity: Insufficiently Active (8/17/2021)    Received from [facility] [employer], [facility]    Exercise Vital Sign     Days of Exercise per Week: 3 days     Minutes of Exercise per Session: 30 min   Stress: No Stress Concern Present (8/17/2021)    Received from [facility] [employer] [facility]    [place] [employer] - Occupational Stress Questionnaire     Feeling of Stress : Not at all   Housing Stability: Low Risk  (11/24/2024)    Housing Stability Vital Sign     Unable to Pay for Housing in the Last Year: No     Homeless in the Last Year: No         Chemistry        Component Value Date/Time     06/20/2024 2306    K 4.0 06/20/2024 2306     06/20/2024 2306    CO2 18 (L) 06/20/2024 2306    BUN 8 06/20/2024 2306    CREATININE 0.6 06/20/2024 2306    GLU 82 06/20/2024 2306        Component Value Date/Time    CALCIUM 9.2 06/20/2024 2306    ALKPHOS 45 (L) 06/20/2024 2306    AST 17 06/20/2024 2306    ALT 11 06/20/2024 2306    BILITOT 0.2 06/20/2024 2306    ESTGFRAFRICA >60.0 04/06/2022 1709    EGFRNONAA >60.0 04/06/2022 1709            Lab Results   Component Value Date    WBC 13.54 (H) 11/24/2024    HGB 10.4 (L) 11/24/2024    HCT 32.1 (L) 11/24/2024    MCV 79 (L) 11/24/2024     11/24/2024       No results for input(s): "PT", "INR", "PROTIME", "APTT" in the last 72 hours.           Pre-op Assessment    I have reviewed the Patient Summary Reports.     I have reviewed the Nursing Notes.    I have reviewed the Medications.     Review of Systems  Anesthesia Hx:               Denies Personal Hx of Anesthesia complications.                    Hematology/Oncology:  Hematology Normal   Oncology Normal                                   EENT/Dental:  EENT/Dental Normal         "   Cardiovascular:  Cardiovascular Normal                                              Pulmonary:  Pulmonary Normal                       Renal/:  Renal/ Normal                 Hepatic/GI:  Hepatic/GI Normal       Esophageal disorder s/p surgery as a child              Musculoskeletal:  Musculoskeletal Normal                Neurological:  Neurology Normal                                      Endocrine:  Endocrine Normal            Dermatological:  Skin Normal    Psych:  Psychiatric History anxiety depression                Physical Exam  General: Well nourished, Cooperative, Alert and Oriented    Airway:  Mallampati: II   Mouth Opening: Normal  Tongue: Normal  Neck ROM: Normal ROM    Dental:  Intact    Chest/Lungs:  Clear to auscultation    Heart:  Rate: Normal  Rhythm: Regular Rhythm        Anesthesia Plan  Type of Anesthesia, risks & benefits discussed:    Anesthesia Type: Gen ETT, Epidural, Spinal, CSE  Intra-op Monitoring Plan: Standard ASA Monitors  Post Op Pain Control Plan: multimodal analgesia, IV/PO Opioids PRN, epidural analgesia and intrathecal opioid  Informed Consent: Informed consent signed with the Patient and all parties understand the risks and agree with anesthesia plan.  All questions answered.   ASA Score: 2  Day of Surgery Review of History & Physical: H&P Update referred to the surgeon/provider.    Ready For Surgery From Anesthesia Perspective.     .

## 2024-11-24 NOTE — ED PROVIDER NOTES
Encounter Date: 2024       History     Chief Complaint   Patient presents with    Rupture of Membranes     Patient came in the [place] with complaints of water leaking after standing up around 10:40AM. Clear fluid continued to trickle since.      Brenda Menendez is a 18 y.o. F at 39w2d presents complaining of leakage of fluid.   This IUP is complicated by anxiety, depression, esophageal disorder.  Patient denies contractions, denies vaginal bleeding, reports LOF.   Fetal Movement: normal      Patient reports leakage of clear/whitish fluid at 10:30am. Denies a big gush, but feels a trickling of leakage still.         Review of patient's allergies indicates:   Allergen Reactions    Gluten protein Anxiety, Diarrhea, Itching, Other (See Comments) and Swelling     Abdominal pain        Past Medical History:   Diagnosis Date    Pneumonia     Varicella      Past Surgical History:   Procedure Laterality Date    ESOPHAGUS SURGERY      Repair as an infant     Family History   Problem Relation Name Age of Onset    Breast cancer Maternal Grandmother      Breast cancer Paternal Grandmother      Colon cancer Neg Hx      Ovarian cancer Neg Hx       Social History     Tobacco Use    Smoking status: Never     Passive exposure: Yes    Smokeless tobacco: Never    Tobacco comments:     dad smokes outside   Substance Use Topics    Alcohol use: No    Drug use: No     Review of Systems   Constitutional:  Negative for chills and fatigue.   HENT:  Negative for congestion and sore throat.    Eyes:  Negative for visual disturbance.   Respiratory:  Negative for chest tightness and shortness of breath.    Cardiovascular:  Negative for chest pain and leg swelling.   Gastrointestinal:  Negative for abdominal pain.   Genitourinary:  Positive for vaginal discharge. Negative for dysuria and vaginal bleeding.   Neurological:  Negative for headaches.       Physical Exam     Initial Vitals [24 1235]   BP Pulse Resp Temp SpO2   130/74 81 17  97.7 °F (36.5 °C) 99 %      MAP       --         Physical Exam    Vitals reviewed.  Constitutional: She appears well-developed and well-nourished. No distress.   HENT:   Head: Normocephalic and atraumatic.   Neck: Neck supple.   Normal range of motion.  Pulmonary/Chest: No respiratory distress.   Abdominal: There is no abdominal tenderness.   Gravid abdomen   Musculoskeletal:      Cervical back: Normal range of motion and neck supple.     Neurological: She is alert and oriented to person, place, and time.   Skin: Skin is warm and dry.   Psychiatric: She has a normal mood and affect.     OB LABOR EXAM:       Method: Sterile vaginal exam per MD and Sterile speculum exam per MD.   Vaginal Bleeding: none present.     Dilatation: 3.   Station: -3.   Effacement: 60%.   Amniotic Fluid Color: normal.   Amniotic Fluid Amount: large.   Comments: SSE: grossly ruptured, nitrazine positive         ED Course   Obtain Fetal nonstress test (NST)    Date/Time: 11/24/2024 1:05 PM    Performed by: Stacy Liu MD  Authorized by: Shivani Horvath MD    Nonstress Test:     Variability:  6-25 BPM    Decelerations:  None    Accelerations:  15 bpm    Baseline:  130    Uterine Irritability: No      Contractions:  Irregular    Contraction Frequency:  10  Biophysical Profile:     Nonstress Test Interpretation: reactive      Overall Impression:  Reassuring    Labs Reviewed   CBC W/ AUTO DIFFERENTIAL - Abnormal       Result Value    WBC 13.54 (*)     RBC 4.09      Hemoglobin 10.4 (*)     Hematocrit 32.1 (*)     MCV 79 (*)     MCH 25.4 (*)     MCHC 32.4      RDW 16.2 (*)     Platelets 263      MPV 11.8      Immature Granulocytes 1.6 (*)     Gran # (ANC) 9.3 (*)     Immature Grans (Abs) 0.22 (*)     Lymph # 2.5      Mono # 1.4 (*)     Eos # 0.1      Baso # 0.05      nRBC 0      Gran % 68.9      Lymph % 18.5      Mono % 10.1      Eosinophil % 0.5      Basophil % 0.4      Differential Method Automated            Imaging Results    None           Medications   sertraline tablet 25 mg ( Oral Canceled Entry 11/24/24 1415)   lactated ringers bolus 1,000 mL (has no administration in time range)   lactated ringers bolus 500 mL (has no administration in time range)   lactated ringers infusion ( Intravenous Canceled Entry 11/24/24 1415)   0.9% NaCl infusion (has no administration in time range)   oxytocin 30 units/500 mL (60 milliunits/mL) in 0.9% NaCl IV bolus from bag (has no administration in time range)   ondansetron disintegrating tablet 8 mg (has no administration in time range)   calcium carbonate 200 mg calcium (500 mg) chewable tablet 500 mg (has no administration in time range)   simethicone chewable tablet 80 mg (has no administration in time range)   LIDOcaine HCL 10 mg/ml (1%) injection 10 mL (has no administration in time range)   oxytocin 30 units/500 mL (60 milliunits/mL) in 0.9% NaCl IV bolus from bag (has no administration in time range)   oxytocin 30 units/500 mL (60 milliunits/mL) in 0.9% NaCl (non-titrating) (has no administration in time range)   oxytocin injection 10 Units (has no administration in time range)   miSOPROStoL tablet 800 mcg (has no administration in time range)   miSOPROStoL tablet 800 mcg (has no administration in time range)   methylergonovine injection 200 mcg (has no administration in time range)   carboprost injection 250 mcg (has no administration in time range)   tranexamic acid in NaCl,iso-os IVPB 1,000 mg (has no administration in time range)   diphenoxylate-atropine 2.5-0.025 mg per tablet 2 tablet (has no administration in time range)   azithromycin (ZITHROMAX) 500 mg in 0.9% NaCl 250 mL IVPB (admixture device) (has no administration in time range)   ceFAZolin 2 g (has no administration in time range)   lactated ringers bolus 500 mL (has no administration in time range)   oxytocin 30 units/500 mL (60 milliunits/mL) in 0.9% NaCl (TITRATING) (4 steven-units/min Intravenous Restarted 11/24/24 9788)   fentanyl 2mcg/mL  with BUPivacaine 0.1% in sdoium chloride 0.9% Epidural 2 mcg/mL- 0.1 % Soln (has no administration in time range)     Medical Decision Making  Brenda Menendez is a 18 y.o. F at 39w2d presents complaining of leakage of fluid.   Temp:  [97.7 °F (36.5 °C)] 97.7 °F (36.5 °C)  Pulse:  [81-86] 86  Resp:  [17] 17  SpO2:  [99 %] 99 %  BP: (121-130)/(71-74) 121/71    NST reactive and reassuring   [place]: q 10min   Vertex on bedside ultrasound    Patient will be transferred to L&D for PROM. Vertex on bedside ultrasound. Consents signed and scanned to chart.    Patient stable for transfer at this time.     Stacy Liu MD  Obstetrics & Gynecology, PGY-1                  Attending Attestation:   Physician Attestation Statement for Resident:  As the supervising MD   Physician Attestation Statement: I have personally seen and examined this patient.   I agree with the above history.  -:   As the supervising MD I agree with the above PE.     As the supervising MD I agree with the above treatment, course, plan, and disposition.   I was personally present during the critical portions of the procedure(s) performed by the resident and was immediately available in the ED to provide services and assistance as needed during the entire procedure.  I have reviewed and agree with the residents interpretation of the following: lab data.  I have reviewed the following: old records at this facility.            Attending ED Notes:   I saw and examined the patient myself along with the resident. I have personally reviewed pertinent prior records, labs, imaging, and procedures. I have reviewed the documentation and agree with the findings and plan as documented.      at 39w2d presenting with leakage of fluid and found to have PROM. NST reactive and reassuring. Admit for IOL.    Sonam Peters MD FACOG  OB Hospitalist                                 Clinical Impression:  Final diagnoses:  [O42.90] Premature rupture of membranes,  unspecified duration to onset of labor, unspecified gestational age (Primary)  [Z3A.39] 39 weeks gestation of pregnancy          ED Disposition Condition    Send to L&D Stable                Stacy Liu MD  Resident  11/24/24 5346       Sonam Peters MD  11/24/24 1487

## 2024-11-24 NOTE — Clinical Note
I certify that Inpatient services for greater than or equal to 2 midnights are medically necessary:: Yes   Transfer To (Destination): [facility] LABOR AND DELIVERY [[id number]]   Diagnosis: Premature rupture of membranes, unspecified duration to onset of labor, unspecified gestational age [[id number]]   Future Attending Provider: ANJU BRADY [[id number]]   Reason for IP Medical Treatment  (Clinical interventions that can only be accomplished in the IP setting? ) :: delivery   Estimated Length of Stay:: 3-4 midnights   Plans for Post-Acute care--if anticipated (pick the single best option):: A. No post acute care anticipated at this time   Special Needs:: No Special Needs [1]

## 2024-11-25 ENCOUNTER — TELEPHONE (OUTPATIENT)
Dept: OBSTETRICS AND GYNECOLOGY | Facility: CLINIC | Age: 19
End: 2024-11-25
Payer: MEDICAID

## 2024-11-25 LAB
BASOPHILS # BLD AUTO: 0.04 K/UL (ref 0–0.2)
BASOPHILS NFR BLD: 0.2 % (ref 0–1.9)
DIFFERENTIAL METHOD BLD: ABNORMAL
EOSINOPHIL # BLD AUTO: 0.1 K/UL (ref 0–0.5)
EOSINOPHIL NFR BLD: 0.5 % (ref 0–8)
ERYTHROCYTE [DISTWIDTH] IN BLOOD BY AUTOMATED COUNT: 16.3 % (ref 11.5–14.5)
HCT VFR BLD AUTO: 31.6 % (ref 37–48.5)
HGB BLD-MCNC: 10 G/DL (ref 12–16)
IMM GRANULOCYTES # BLD AUTO: 0.25 K/UL (ref 0–0.04)
IMM GRANULOCYTES NFR BLD AUTO: 1.5 % (ref 0–0.5)
LYMPHOCYTES # BLD AUTO: 2.8 K/UL (ref 1–4.8)
LYMPHOCYTES NFR BLD: 16.9 % (ref 18–48)
MCH RBC QN AUTO: 25.6 PG (ref 27–31)
MCHC RBC AUTO-ENTMCNC: 31.6 G/DL (ref 32–36)
MCV RBC AUTO: 81 FL (ref 82–98)
MONOCYTES # BLD AUTO: 1.8 K/UL (ref 0.3–1)
MONOCYTES NFR BLD: 10.7 % (ref 4–15)
NEUTROPHILS # BLD AUTO: 11.4 K/UL (ref 1.8–7.7)
NEUTROPHILS NFR BLD: 70.2 % (ref 38–73)
NRBC BLD-RTO: 0 /100 WBC
PLATELET # BLD AUTO: 227 K/UL (ref 150–450)
PMV BLD AUTO: 11.7 FL (ref 9.2–12.9)
RBC # BLD AUTO: 3.9 M/UL (ref 4–5.4)
WBC # BLD AUTO: 16.32 K/UL (ref 3.9–12.7)

## 2024-11-25 PROCEDURE — 72200005 HC VAGINAL DELIVERY LEVEL II

## 2024-11-25 PROCEDURE — 25000003 PHARM REV CODE 250

## 2024-11-25 PROCEDURE — 85025 COMPLETE CBC W/AUTO DIFF WBC: CPT

## 2024-11-25 PROCEDURE — 11000001 HC ACUTE MED/SURG PRIVATE ROOM

## 2024-11-25 PROCEDURE — 72100002 HC LABOR CARE, 1ST 8 HOURS

## 2024-11-25 PROCEDURE — 36415 COLL VENOUS BLD VENIPUNCTURE: CPT

## 2024-11-25 PROCEDURE — 72100003 HC LABOR CARE, EA. ADDL. 8 HRS

## 2024-11-25 PROCEDURE — 99232 SBSQ HOSP IP/OBS MODERATE 35: CPT | Mod: ,,,

## 2024-11-25 RX ORDER — OXYCODONE HYDROCHLORIDE 5 MG/1
5 TABLET ORAL EVERY 4 HOURS PRN
Status: DISCONTINUED | OUTPATIENT
Start: 2024-11-25 | End: 2024-11-26 | Stop reason: HOSPADM

## 2024-11-25 RX ORDER — DIPHENHYDRAMINE HYDROCHLORIDE 50 MG/ML
25 INJECTION INTRAMUSCULAR; INTRAVENOUS EVERY 4 HOURS PRN
Status: DISCONTINUED | OUTPATIENT
Start: 2024-11-25 | End: 2024-11-26 | Stop reason: HOSPADM

## 2024-11-25 RX ORDER — OXYTOCIN-SODIUM CHLORIDE 0.9% IV SOLN 30 UNIT/500ML 30-0.9/5 UT/ML-%
95 SOLUTION INTRAVENOUS CONTINUOUS PRN
Status: DISCONTINUED | OUTPATIENT
Start: 2024-11-25 | End: 2024-11-25

## 2024-11-25 RX ORDER — OXYCODONE AND ACETAMINOPHEN 5; 325 MG/1; MG/1
1 TABLET ORAL EVERY 4 HOURS PRN
Status: DISCONTINUED | OUTPATIENT
Start: 2024-11-25 | End: 2024-11-25

## 2024-11-25 RX ORDER — SIMETHICONE 80 MG
1 TABLET,CHEWABLE ORAL EVERY 6 HOURS PRN
Status: DISCONTINUED | OUTPATIENT
Start: 2024-11-25 | End: 2024-11-26 | Stop reason: HOSPADM

## 2024-11-25 RX ORDER — DIPHENHYDRAMINE HCL 25 MG
25 CAPSULE ORAL EVERY 4 HOURS PRN
Status: DISCONTINUED | OUTPATIENT
Start: 2024-11-25 | End: 2024-11-26 | Stop reason: HOSPADM

## 2024-11-25 RX ORDER — PRENATAL WITH FERROUS FUM AND FOLIC ACID 3080; 920; 120; 400; 22; 1.84; 3; 20; 10; 1; 12; 200; 27; 25; 2 [IU]/1; [IU]/1; MG/1; [IU]/1; MG/1; MG/1; MG/1; MG/1; MG/1; MG/1; UG/1; MG/1; MG/1; MG/1; MG/1
1 TABLET ORAL DAILY
Status: DISCONTINUED | OUTPATIENT
Start: 2024-11-25 | End: 2024-11-26 | Stop reason: HOSPADM

## 2024-11-25 RX ORDER — HYDROCORTISONE 25 MG/G
CREAM TOPICAL 3 TIMES DAILY PRN
Status: DISCONTINUED | OUTPATIENT
Start: 2024-11-25 | End: 2024-11-26 | Stop reason: HOSPADM

## 2024-11-25 RX ORDER — IBUPROFEN 600 MG/1
600 TABLET ORAL EVERY 6 HOURS
Status: DISCONTINUED | OUTPATIENT
Start: 2024-11-25 | End: 2024-11-26 | Stop reason: HOSPADM

## 2024-11-25 RX ORDER — OXYCODONE AND ACETAMINOPHEN 10; 325 MG/1; MG/1
1 TABLET ORAL EVERY 4 HOURS PRN
Status: DISCONTINUED | OUTPATIENT
Start: 2024-11-25 | End: 2024-11-25

## 2024-11-25 RX ORDER — DOCUSATE SODIUM 100 MG/1
200 CAPSULE, LIQUID FILLED ORAL 2 TIMES DAILY PRN
Status: DISCONTINUED | OUTPATIENT
Start: 2024-11-25 | End: 2024-11-26 | Stop reason: HOSPADM

## 2024-11-25 RX ORDER — OXYCODONE HYDROCHLORIDE 10 MG/1
10 TABLET ORAL EVERY 4 HOURS PRN
Status: DISCONTINUED | OUTPATIENT
Start: 2024-11-25 | End: 2024-11-26 | Stop reason: HOSPADM

## 2024-11-25 RX ORDER — ACETAMINOPHEN 325 MG/1
650 TABLET ORAL EVERY 6 HOURS
Status: DISCONTINUED | OUTPATIENT
Start: 2024-11-25 | End: 2024-11-26 | Stop reason: HOSPADM

## 2024-11-25 RX ORDER — OXYTOCIN-SODIUM CHLORIDE 0.9% IV SOLN 30 UNIT/500ML 30-0.9/5 UT/ML-%
95 SOLUTION INTRAVENOUS CONTINUOUS PRN
Status: DISCONTINUED | OUTPATIENT
Start: 2024-11-25 | End: 2024-11-26 | Stop reason: HOSPADM

## 2024-11-25 RX ADMIN — ACETAMINOPHEN 650 MG: 325 TABLET, FILM COATED ORAL at 09:11

## 2024-11-25 RX ADMIN — IBUPROFEN 600 MG: 600 TABLET, FILM COATED ORAL at 09:11

## 2024-11-25 RX ADMIN — IBUPROFEN 600 MG: 600 TABLET, FILM COATED ORAL at 03:11

## 2024-11-25 RX ADMIN — IBUPROFEN 600 MG: 600 TABLET, FILM COATED ORAL at 01:11

## 2024-11-25 RX ADMIN — ACETAMINOPHEN 650 MG: 325 TABLET, FILM COATED ORAL at 01:11

## 2024-11-25 RX ADMIN — IBUPROFEN 600 MG: 600 TABLET, FILM COATED ORAL at 08:11

## 2024-11-25 RX ADMIN — PRENATAL VIT W/ FE FUMARATE-FA TAB 27-0.8 MG 1 TABLET: 27-0.8 TAB at 08:11

## 2024-11-25 RX ADMIN — DOCUSATE SODIUM 200 MG: 100 CAPSULE, LIQUID FILLED ORAL at 08:11

## 2024-11-25 RX ADMIN — SERTRALINE HYDROCHLORIDE 25 MG: 25 TABLET ORAL at 08:11

## 2024-11-25 NOTE — SUBJECTIVE & OBJECTIVE
Interval History: PPD1    She is doing well this morning. She is tolerating a regular diet without nausea or vomiting. She is voiding spontaneously. She is ambulating. She has passed flatus, and has not a BM. Vaginal bleeding is mild. She denies fever or chills. Abdominal pain is mild and controlled with oral medications. She Is breastfeeding.     Objective:     Vital Signs (Most Recent):  Temp: 98.8 °F (37.1 °C) (11/25/24 0815)  Pulse: 68 (11/25/24 0815)  Resp: 16 (11/25/24 0815)  BP: 123/60 (11/25/24 0815)  SpO2: 95 % (11/25/24 0815) Vital Signs (24h Range):  Temp:  [97.4 °F (36.3 °C)-98.8 °F (37.1 °C)] 98.8 °F (37.1 °C)  Pulse:  [67-98] 68  Resp:  [16-21] 16  SpO2:  [95 %-99 %] 95 %  BP: ()/(50-86) 123/60        There is no height or weight on file to calculate BMI.      Intake/Output Summary (Last 24 hours) at 11/25/2024 1022  Last data filed at 11/25/2024 0445  Gross per 24 hour   Intake 320.98 ml   Output 1250 ml   Net -929.02 ml         Significant Labs:  Lab Results   Component Value Date    GROUPTRH A POS 11/24/2024    HEPBSAG Non-reactive 05/29/2024    STREPBCULT No Group B Streptococcus isolated 11/04/2024     Recent Labs   Lab 11/25/24  0542   HGB 10.0*   HCT 31.6*       I have personallly reviewed all pertinent lab results from the last 24 hours.    Physical Exam:   Constitutional: She is oriented to person, place, and time. She appears well-developed and well-nourished.    HENT:   Head: Normocephalic.    Eyes: Conjunctivae are normal.     Cardiovascular:  Normal rate.             Pulmonary/Chest: No respiratory distress.        Abdominal: There is no abdominal tenderness.     Genitourinary:    Vagina and uterus normal.             Musculoskeletal: Normal range of motion.       Neurological: She is alert and oriented to person, place, and time.    Skin: Skin is warm and dry.    Psychiatric: She has a normal mood and affect. Her behavior is normal. Judgment and thought content normal.       Review  of Systems   Constitutional:  Negative for chills and fever.   Eyes:  Negative for visual disturbance.   Respiratory:  Negative for cough.    Cardiovascular:  Negative for chest pain.   Gastrointestinal:  Negative for nausea and vomiting.   Genitourinary:  Positive for vaginal bleeding.   Neurological:  Negative for seizures and headaches.   All other systems reviewed and are negative.

## 2024-11-25 NOTE — HOSPITAL COURSE
11/25/24: PPD1, doing well, normal involution, routine PP orders.  11/26/24 PPD2 - Doing well today, normal lochia, pain controlled with PO meds. Breastfeeding going well. Anemia asymptomatic. Denies s/s of pre-E. Plan to discharge home today.

## 2024-11-25 NOTE — PROGRESS NOTES
[facility])  Obstetrics  Postpartum Progress Note    Patient Name: Brenda Menendez  MRN: [id number]  Admission Date: 2024  Hospital Length of Stay: 1 days  Attending Physician: Carmen Duenas MD  Primary Care Provider: Jo, Primary Doctor    Subjective:     Principal Problem: (spontaneous vaginal delivery)    Hospital Course:  24: PPD1, doing well, normal involution, routine PP orders.    Interval History: PPD1    She is doing well this morning. She is tolerating a regular diet without nausea or vomiting. She is voiding spontaneously. She is ambulating. She has passed flatus, and has not a BM. Vaginal bleeding is mild. She denies fever or chills. Abdominal pain is mild and controlled with oral medications. She Is breastfeeding.     Objective:     Vital Signs (Most Recent):  Temp: 98.8 °F (37.1 °C) (24 0815)  Pulse: 68 (24)  Resp: 16 (24)  BP: 123/60 (24)  SpO2: 95 % (24) Vital Signs (24h Range):  Temp:  [97.4 °F (36.3 °C)-98.8 °F (37.1 °C)] 98.8 °F (37.1 °C)  Pulse:  [67-98] 68  Resp:  [16-21] 16  SpO2:  [95 %-99 %] 95 %  BP: ()/(50-86) 123/60        There is no height or weight on file to calculate BMI.      Intake/Output Summary (Last 24 hours) at 2024 1022  Last data filed at 2024 0445  Gross per 24 hour   Intake 320.98 ml   Output 1250 ml   Net -929.02 ml         Significant Labs:  Lab Results   Component Value Date    GROUPTRH A POS 2024    HEPBSAG Non-reactive 2024    STREPBCULT No Group B Streptococcus isolated 2024     Recent Labs   Lab 24  0542   HGB 10.0*   HCT 31.6*       I have personallly reviewed all pertinent lab results from the last 24 hours.    Physical Exam:   Constitutional: She is oriented to person, place, and time. She appears well-developed and well-nourished.    HENT:   Head: Normocephalic.    Eyes: Conjunctivae are normal.     Cardiovascular:  Normal rate.              Pulmonary/Chest: No respiratory distress.        Abdominal: There is no abdominal tenderness.     Genitourinary:    Vagina and uterus normal.             Musculoskeletal: Normal range of motion.       Neurological: She is alert and oriented to person, place, and time.    Skin: Skin is warm and dry.    Psychiatric: She has a normal mood and affect. Her behavior is normal. Judgment and thought content normal.       Review of Systems   Constitutional:  Negative for chills and fever.   Eyes:  Negative for visual disturbance.   Respiratory:  Negative for cough.    Cardiovascular:  Negative for chest pain.   Gastrointestinal:  Negative for nausea and vomiting.   Genitourinary:  Positive for vaginal bleeding.   Neurological:  Negative for seizures and headaches.   All other systems reviewed and are negative.    Assessment/Plan:     18 y.o. female  for:    *  (spontaneous vaginal delivery)  Routine PP care    Second degree perineal laceration during delivery  Routine jeimy-care    Depression  Continue Zoloft. 2 week PP mood check.        Disposition: As patient meets milestones, will plan to discharge tomorrow.    Deborah Soriano CNM  Obstetrics  [facility]

## 2024-11-25 NOTE — ANESTHESIA PROCEDURE NOTES
Epidural    Patient location during procedure: OB   Reason for block: primary anesthetic   Reason for block: labor analgesia requested by patient and obstetrician  Diagnosis: iup in labor   Start time: 11/24/2024 5:58 PM  Timeout: 11/24/2024 5:55 PM  End time: 11/24/2024 6:12 PM  Surgery related to: Vaginal Delivery    Staffing  Performing Provider: Jyothi Leavitt MD  Authorizing Provider: Jyothi Leavitt MD    Staffing  Performed by: Jyothi Leavitt MD  Authorized by: Jyothi Leavitt MD        Preanesthetic Checklist  Completed: patient identified, IV checked, site marked, risks and benefits discussed, surgical consent, monitors and equipment checked, pre-op evaluation, timeout performed, anesthesia consent given, hand hygiene performed and patient being monitored  Preparation  Patient position: sitting  Prep: ChloraPrep  Patient monitoring: Pulse Ox  Reason for block: primary anesthetic   Epidural  Skin Anesthetic: lidocaine 1%  Skin Wheal: 3 mL  Administration type: continuous  Approach: midline  Interspace: L3-4    Injection technique: CHING saline  Needle and Epidural Catheter  Needle type: Tuohy   Needle gauge: 17  Needle length: 3.5 inches  Needle insertion depth: 6 cm  Catheter type: springwIntermolecular  Catheter size: 19 G  Catheter at skin depth: 10 cm  Insertion Attempts: 1  Test dose: 3 mL of lidocaine 1.5% with Epi 1-to-200,000  Additional Documentation: incremental injection, negative aspiration for heme and CSF, no paresthesia on injection, no signs/symptoms of IV or SA injection, no significant pain on injection and no significant complaints from patient  Needle localization: anatomical landmarks  Medications:  Volume per aspiration: 5 mL  Time between aspirations: 5 minutes   Assessment  Ease of block: easy  Patient's tolerance of the procedure: comfortable throughout block and no complaints No inadvertent dural puncture with Tuohy.  Dural puncture performed with spinal needle.

## 2024-11-25 NOTE — L&D DELIVERY NOTE
"[facility] - [facility] Delivery  Vaginal Delivery   Operative Note    SUMMARY     Normal spontaneous vaginal delivery of live infant, was placed on mothers abdomen for skin to skin and bulb suctioning performed.  Infant delivered position OA over intact perineum.  Nuchal cord: No.    Spontaneous delivery of placenta and IV pitocin given noting intermittent uterine atony with bleeding. 800 mcg Cytotec given WV noting good uterine tone.  2nd degree laceration noted and repaired in normal fashion with 2-0 and 3-0 vicryl .  Patient tolerated delivery well. Sponge needle and lap counted correctly x2.    Indications: PROM (premature rupture of membranes)  Pregnancy complicated by:   Patient Active Problem List   Diagnosis    Anxiety    Depression    Pain    Esophageal disorder    Nonintractable headache     (spontaneous vaginal delivery)     Admitting GA: 39w2d    Delivery Information for Mitzi Menendez    Birth information:  YOB: 2026   Time of birth: 11:17 PM   Sex: female   Head Delivery Date/Time: 2024 11:17 PM   Delivery type: Vaginal, Spontaneous   Gestational Age: 39w2d       Delivery Providers    Delivering clinician: Sonam Peters MD   Provider Role    Deborah Dempsey MD Resident    Estefania Salcido MD Resident    Nimisha Flores RN Registered Nurse    Estefania Long RN Registered Nurse    Tita Bahena RN Registered Nurse    Zunilda Schroeder [facility]              Measurements    Weight: 3470 g  Weight (lbs): 7 lb 10.4 oz  Length: 52.1 cm  Length (in): 20.5"  Head circumference: 34 cm  Chest circumference: 32 cm         Apgars    Living status: Living  Apgar Component Scores:  1 min.:  5 min.:  10 min.:  15 min.:  20 min.:    Skin color:  1  1       Heart rate:  2  2       Reflex irritability:  2  2       Muscle tone:  2  2       Respiratory effort:  1  2       Total:  8  9       Apgars assigned by: TR LONG RN         Operative Delivery    Forceps attempted?: " No  Vacuum extractor attempted?: No         Shoulder Dystocia    Shoulder dystocia present?: No           Presentation    Presentation: Vertex  Position: Occiput Anterior           Interventions/Resuscitation    Method: Bulb Suctioning, Tactile Stimulation       Cord    Vessels: 3 vessels  Complications: None  Delayed Cord Clamping?: Yes  Cord Clamped Date/Time: 2024 11:18 PM  Cord Blood Disposition: Sent with Baby  Gases Sent?: No  Stem Cell Collection (by MD): No       Placenta    Placenta delivery date/time: 2024 2321  Placenta removal: Expressed  Placenta appearance: Intact  Placenta disposition: Discarded           Labor Events:       labor: No     Labor Onset Date/Time: 2024 10:40     Dilation Complete Date/Time: 2024 22:50     Start Pushing Date/Time: 2024 22:58       Start Pushing Date/Time: 2024 22:58     Rupture Date/Time: 24 1040        Rupture type: SRM (Spontaneous Rupture)        Fluid Amount:       Fluid Color: Clear              steroids: None     Antibiotics given for GBS: No     Induction:       Indications for induction:        Augmentation: oxytocin     Indications for augmentation: Ineffective Contraction Pattern     Labor complications: None     Additional complications:          Cervical ripening:                     Delivery:      Episiotomy: None     Indication for Episiotomy:       Perineal Lacerations: 2nd Repaired:  Yes   Periurethral Laceration:   Repaired:     Labial Laceration:   Repaired:     Sulcus Laceration:   Repaired:     Vaginal Laceration:   Repaired:     Cervical Laceration:   Repaired:     Repair suture:       Repair # of packets: 1     Last Value - EBL - Nursing (mL):       Sum - EBL - Nursing (mL): 0     Last Value - EBL - Anesthesia (mL):      Calculated QBL (mL): 150     Running total QBL (mL): 150     Vaginal Sweep Performed: Yes     Surgicount Correct: Yes     Vaginal Packing: No Quantity:       Other providers:        Anesthesia    Method: Epidural          Details (if applicable):  Trial of Labor      Categorization:      Priority:     Indications for :     Incision Type:       Additional  information:  Forceps:    Vacuum:    Breech:    Observed anomalies    Other (Comments):       Estefania Salcido MD  Obstetrics & Gynecology, PGY-1

## 2024-11-25 NOTE — PROGRESS NOTES
LABOR NOTE    S:  Complaints: No.  Epidural Working:  not applicable    O: BP (!) 117/59   Pulse 71   Temp 98 °F (36.7 °C) (Oral)   Resp (!) 21   LMP 02/26/2024   SpO2 98%   Breastfeeding No     FHT: 110 bpm, mod variability, +accels, + prolonged 7 min decel, Cat 2 (reassuring after repositioning)  CTX: difficult to discern, pit @ 4 > paused, IUPC placed  SVE: 5/90/-2, FSE placed    TIMELINE:   1030: SROM clear  1300: 3/50/-3  1645: 4/80/-2, pit @ 12  2030: 5/90/-2, pit @ 4> paused. FSE and IUPC placed.     PLAN:    Continue Close Maternal/Fetal Monitoring  Plan to restart Pitocin in 30 min if FHT remain reassuring.  Recheck 2-4 hours or PRN    Estefania Salcido MD  Obstetrics & Gynecology, PGY-1

## 2024-11-25 NOTE — CARE UPDATE
Resident to bedside to replace IUPC as was not tracing. IUPC replaced without issue. SVE: 5/90/-2. Cat 1 tracing     Jillian Cortez MD  Obstetrics and Gynecology, PGY-2

## 2024-11-25 NOTE — TELEPHONE ENCOUNTER
Attempted to contact Brenda Menendez today, 11/25/2024, at approximately 12:39 PM.  There was no answer.     No VM identifier.  Left a message with my name and callback number. Wanted to check in.

## 2024-11-25 NOTE — ANESTHESIA POSTPROCEDURE EVALUATION
Anesthesia Post Evaluation    Patient: Brenda Menendez    Procedure(s) Performed: * No procedures listed *    Final Anesthesia Type: epidural      Patient location during evaluation: floor  Patient participation: Yes- Able to Participate  Level of consciousness: awake and alert  Post-procedure vital signs: reviewed and stable  Pain management: adequate  Airway patency: patent    PONV status at discharge: No PONV  Anesthetic complications: no      Cardiovascular status: stable  Respiratory status: unassisted, spontaneous ventilation and room air  Hydration status: euvolemic  Follow-up not needed.              Vitals Value Taken Time   /60 11/25/24 0815   Temp 37.1 °C (98.8 °F) 11/25/24 0815   Pulse 68 11/25/24 0815   Resp 16 11/25/24 0815   SpO2 95 % 11/25/24 0815         No case tracking events are documented in the log.      Pain/Keeley Score: Pain Rating Prior to Med Admin: 5 (11/25/2024  8:48 AM)

## 2024-11-25 NOTE — LACTATION NOTE
11/25/24 1020   Maternal Assessment   Breast Shape Bilateral:;round   Breast Density Bilateral:;soft   Areola Bilateral:;elastic   Nipples Bilateral:;everted   Right Nipple Symptoms tender   Maternal Infant Feeding   Maternal Emotional State assist needed;relaxed   Infant Positioning clutch/football;cross-cradle   Signs of Milk Transfer audible swallow;infant jaw motion present   Latch Assistance yes     Assisted pt with breastfeeding. Baby able to latch to both breast with minimal assistance.good tugs and pulls observed. Basic breastfeeding education provided. Questions answered. Skin to skin encouraged.

## 2024-11-25 NOTE — PROGRESS NOTES
LABOR NOTE    S:  Complaints: No.  Epidural Working:  not applicable    O: BP (!) 105/56   Pulse 89   Temp 98.2 °F (36.8 °C) (Oral)   Resp 16   LMP 02/26/2024   SpO2 97%   Breastfeeding No     FHT: 110 bpm, mod variability, +accels, + prolonged 7 min decel, Cat 2 (reassuring after repositioning)  CTX: CTX q2 min  SVE: 10/100/+1    TIMELINE:   1030: SROM clear  1300: 3/50/-3  1645: 4/80/-2, pit @ 12  2030: 5/90/-2, pit @ 4> paused. FSE and IUPC placed.   2300: 10/100/+1    PLAN:    Continue Close Maternal/Fetal Monitoring  Plan to set up for delivery.     Estefania Salcido MD  Obstetrics & Gynecology, PGY-1

## 2024-11-26 ENCOUNTER — TELEPHONE (OUTPATIENT)
Dept: OBSTETRICS AND GYNECOLOGY | Facility: CLINIC | Age: 19
End: 2024-11-26
Payer: MEDICAID

## 2024-11-26 VITALS
HEART RATE: 69 BPM | DIASTOLIC BLOOD PRESSURE: 84 MMHG | SYSTOLIC BLOOD PRESSURE: 116 MMHG | TEMPERATURE: 99 F | OXYGEN SATURATION: 100 % | RESPIRATION RATE: 18 BRPM

## 2024-11-26 PROCEDURE — 99238 HOSP IP/OBS DSCHRG MGMT 30/<: CPT | Mod: ,,,

## 2024-11-26 PROCEDURE — 25000003 PHARM REV CODE 250

## 2024-11-26 RX ORDER — IBUPROFEN 600 MG/1
600 TABLET ORAL EVERY 6 HOURS PRN
Qty: 30 TABLET | Refills: 0 | Status: SHIPPED | OUTPATIENT
Start: 2024-11-26

## 2024-11-26 RX ADMIN — IBUPROFEN 600 MG: 600 TABLET, FILM COATED ORAL at 05:11

## 2024-11-26 RX ADMIN — PRENATAL VIT W/ FE FUMARATE-FA TAB 27-0.8 MG 1 TABLET: 27-0.8 TAB at 08:11

## 2024-11-26 RX ADMIN — DOCUSATE SODIUM 200 MG: 100 CAPSULE, LIQUID FILLED ORAL at 07:11

## 2024-11-26 RX ADMIN — SERTRALINE HYDROCHLORIDE 25 MG: 25 TABLET ORAL at 08:11

## 2024-11-26 RX ADMIN — ACETAMINOPHEN 650 MG: 325 TABLET, FILM COATED ORAL at 05:11

## 2024-11-26 RX ADMIN — IBUPROFEN 600 MG: 600 TABLET, FILM COATED ORAL at 11:11

## 2024-11-26 RX ADMIN — ACETAMINOPHEN 650 MG: 325 TABLET, FILM COATED ORAL at 11:11

## 2024-11-26 NOTE — ASSESSMENT & PLAN NOTE
11/26/24 PPD2 - Doing well today, normal lochia, pain controlled with PO meds. Breastfeeding going well. Anemia asymptomatic. Denies s/s of pre-E. Plan to discharge home today.

## 2024-11-26 NOTE — DISCHARGE SUMMARY
"[facility])  Obstetrics  Discharge Summary      Patient Name: Brenda Menendez  MRN: [id number]  Admission Date: 2024  Hospital Length of Stay: 2 days  Discharge Date and Time:  2024 10:22 AM  Attending Physician: Carmen Brady MD   Discharging Provider: Syeda Cuellar CNM   Primary Care Provider: Jo, Primary Doctor    HPI: No notes on file        * No surgery found *     Hospital Course:   24: PPD1, doing well, normal involution, routine PP orders.  24 PPD2 - Doing well today, normal lochia, pain controlled with PO meds. Breastfeeding going well. Anemia asymptomatic. Denies s/s of pre-E. Plan to discharge home today.      Consults (From admission, onward)          Status Ordering Provider     Inpatient consult to Lactation  Once        Provider:  (Not yet assigned)    Acknowledged CARMEN BRADY            Final Active Diagnoses:    Diagnosis Date Noted POA    PRINCIPAL PROBLEM:   (spontaneous vaginal delivery) [O80] 2024 Not Applicable    Second degree perineal laceration during delivery [O70.1] 2024 No    Depression [F32.A] 2022 Yes      Problems Resolved During this Admission:    Diagnosis Date Noted Date Resolved POA    PROM (premature rupture of membranes) [O42.90] 2024 Yes        Significant Diagnostic Studies: Labs: CBC   Recent Labs   Lab 24  1317 24  0542   WBC 13.54* 16.32*   HGB 10.4* 10.0*   HCT 32.1* 31.6*    227         Feeding Method: breast    Immunizations       None            Delivery:    Episiotomy: None   Lacerations: 2nd   Repair suture:     Repair # of packets: 1   Blood loss (ml):       Birth information:  YOB: 2026   Time of birth: 11:17 PM   Sex: female   Delivery type: Vaginal, Spontaneous   Gestational Age: 39w2d     Measurements    Weight: 3470 g  Weight (lbs): 7 lb 10.4 oz  Length: 52.1 cm  Length (in): 20.5"  Head circumference: 34 cm  Chest circumference: 32 " cm         Delivery Clinician: Delivery Providers    Delivering clinician: Sonam Peters MD   Provider Role    Deborah Dempsey MD Resident    Estefania Salcido MD Resident    Nimisha Flores RN Registered Nurse    Estefania Long RN Registered Nurse    Tita Bahena RN Registered Nurse    [facility]             Additional  information:  Forceps:    Vacuum:    Breech:    Observed anomalies      Living?:     Apgars    Living status: Living  Apgar Component Scores:  1 min.:  5 min.:  10 min.:  15 min.:  20 min.:    Skin color:  1  1       Heart rate:  2  2       Reflex irritability:  2  2       Muscle tone:  2  2       Respiratory effort:  1  2       Total:  8  9       Apgars assigned by: TR LONG RN         Placenta: Delivered:       appearance  Pending Diagnostic Studies:       None            Discharged Condition: stable    Disposition: Home or Self Care    Follow Up:   Follow-up Information       Carmen Duenas MD. Schedule an appointment as soon as possible for a visit in 6 week(s).    Specialty: Obstetrics and Gynecology  Why: postpartum visit  Contact information:  [address] [place] [id number] [phone]               Carmen Duenas MD. Schedule an appointment as soon as possible for a visit in 2 week(s).    Specialty: Obstetrics and Gynecology  Why: Mood check  Contact information:  [address] [place] [id number] [phone]                           Patient Instructions:      Diet general     Diet Adult Regular     Pelvic Rest     Lifting restrictions     Call MD for:  temperature >100.4     Call MD for:  persistent nausea and vomiting     Call MD for:  severe uncontrolled pain     Call MD for:  difficulty breathing, headache or visual disturbances     Call MD for:  redness, tenderness, or signs of infection (pain, swelling, redness, odor or green/yellow discharge around incision site)     Call MD for:   hives     Call MD for:  persistent dizziness or light-headedness     Call MD for:  extreme fatigue     Call MD for:   Scheduling Instructions: 1. vaginal bleeding to fill a peripad in less than an hour or passing clots larger than a golf ball   2. Thought of harming yourself or your baby.     Pelvic Rest     Notify your health care provider if you experience any of the following:  temperature >100.4     Notify your health care provider if you experience any of the following:  persistent nausea and vomiting or diarrhea     Notify your health care provider if you experience any of the following:  severe uncontrolled pain     Notify your health care provider if you experience any of the following:  redness, tenderness, or signs of infection (pain, swelling, redness, odor or green/yellow discharge around incision site)     Notify your health care provider if you experience any of the following:  difficulty breathing or increased cough     Notify your health care provider if you experience any of the following:  severe persistent headache     Notify your health care provider if you experience any of the following:  worsening rash     Notify your health care provider if you experience any of the following:  persistent dizziness, light-headedness, or visual disturbances     Notify your health care provider if you experience any of the following:  increased confusion or weakness     Activity as tolerated     Weight bearing restrictions (specify)   Order Comments: add 10 pounds or weight of baby     Activity as tolerated     Medications:  Current Discharge Medication List        START taking these medications    Details   ibuprofen (ADVIL,MOTRIN) 600 MG tablet Take 1 tablet (600 mg total) by mouth every 6 (six) hours as needed for Pain.  Qty: 30 tablet, Refills: 0           CONTINUE these medications which have NOT CHANGED    Details   ondansetron (ZOFRAN-ODT) 4 MG TbDL Take 1 tablet (4 mg total) by mouth every 6 (six) hours  as needed (Nausea).  Qty: 10 tablet, Refills: 0      prenatal 87-iron bis-folic-dha 32 mg iron- 1,000 mcg-230mg Cmpk Take 1 tablet by mouth once daily.      sertraline (ZOLOFT) 25 MG tablet Take 1 tablet (25 mg total) by mouth once daily.  Qty: 30 tablet, Refills: 11    Associated Diagnoses: Depression during pregnancy in first trimester             Syeda Cuellar CNM  Obstetrics  [facility])

## 2024-11-26 NOTE — PROGRESS NOTES
[facility])  Obstetrics  Postpartum Progress Note    Patient Name: Brenda Menendez  MRN: [id number]  Admission Date: 2024  Hospital Length of Stay: 2 days  Attending Physician: Carmen Duenas MD  Primary Care Provider: Jo, Primary Doctor    Subjective:     Principal Problem: (spontaneous vaginal delivery)    Hospital Course:  24: PPD1, doing well, normal involution, routine PP orders.  24 PPD2 - Doing well today, normal lochia, pain controlled with PO meds. Breastfeeding going well. Anemia asymptomatic. Denies s/s of pre-E. Plan to discharge home today.     Interval History: PPD2    She is doing well this morning. She is tolerating a regular diet without nausea or vomiting. She is voiding spontaneously. She is ambulating. She has passed flatus, and has a BM. Vaginal bleeding is mild. She denies fever or chills. Abdominal pain is mild and controlled with oral medications. She Is breastfeeding. She desires circumcision for her male baby: not applicable.    Objective:     Vital Signs (Most Recent):  Temp: 98.7 °F (37.1 °C) (24)  Pulse: 69 (24)  Resp: 18 (24)  BP: 116/84 (24)  SpO2: 100 % (24) Vital Signs (24h Range):  Temp:  [97.9 °F (36.6 °C)-98.7 °F (37.1 °C)] 98.7 °F (37.1 °C)  Pulse:  [69-72] 69  Resp:  [16-18] 18  SpO2:  [99 %-100 %] 100 %  BP: (116-132)/(72-84) 116/84        There is no height or weight on file to calculate BMI.      Intake/Output Summary (Last 24 hours) at 2024 1018  Last data filed at 2024 1200  Gross per 24 hour   Intake --   Output 300 ml   Net -300 ml         Significant Labs:  Lab Results   Component Value Date    GROUPTRH A POS 2024    HEPBSAG Non-reactive 2024    STREPBCULT No Group B Streptococcus isolated 2024     Recent Labs   Lab 24  0542   HGB 10.0*   HCT 31.6*       I have personallly reviewed all pertinent lab results from the last   hours.    Physical Exam:   Constitutional: She is oriented to person, place, and time. She appears well-developed and well-nourished.    HENT:   Head: Normocephalic and atraumatic.    Eyes: Conjunctivae are normal.     Cardiovascular:  Normal rate.             Pulmonary/Chest: Effort normal.        Abdominal: Soft. There is no abdominal tenderness.     Genitourinary: There is vaginal discharge (lochia) in the vagina.           Musculoskeletal: Normal range of motion.       Neurological: She is alert and oriented to person, place, and time.    Skin: Skin is warm and dry.    Psychiatric: She has a normal mood and affect. Her behavior is normal. Judgment and thought content normal.       Review of Systems   Constitutional:  Negative for chills and fever.   Eyes: Negative.  Negative for visual disturbance.   Respiratory: Negative.     Cardiovascular: Negative.    Gastrointestinal:  Positive for abdominal pain (cramping). Negative for nausea and vomiting.   Endocrine: Negative.    Genitourinary:  Positive for vaginal bleeding (lochia). Negative for vaginal odor.   Musculoskeletal: Negative.    Integumentary:  Negative.   Neurological: Negative.  Negative for headaches.   Hematological: Negative.    Psychiatric/Behavioral: Negative.     Breast: negative.      Assessment/Plan:     18 y.o. female  for:    *  (spontaneous vaginal delivery)  24 PPD2 - Doing well today, normal lochia, pain controlled with PO meds. Breastfeeding going well. Anemia asymptomatic. Denies s/s of pre-E. Plan to discharge home today.     Second degree perineal laceration during delivery  Routine jeimy-care    Depression  Continue Zoloft. 2 week PP mood check.        Disposition: As patient meets milestones, will plan to discharge today.    Syeda Cuellar CNM  Obstetrics  [facility])

## 2024-11-26 NOTE — TELEPHONE ENCOUNTER
----- Message from SP Reyes sent at 11/25/2024  4:13 PM CST -----  Hi, can you please call ms valenzuela to schedule a post partum mood check appt. Thank you   FAMILY HISTORY:  No pertinent family history in first degree relatives

## 2024-11-26 NOTE — DISCHARGE INSTRUCTIONS
Community Resources for Breastfeeding Mothers:   Hospital Breastfeeding Centers/ Lactation Consultants:   [facility]........................................................................................[phone]  Outpatient Lactation Consults               [phone]   [facility]....................[phone]   [facility]..........................................................................................[phone]   [facility].................................................................................[phone]   [facility].......................................................................................[phone]   [facility].................................................................[phone]   [facility].......................................[phone]   [facility]..................................................[phone]   [facility].....................................................................[phone]      [facility] (Poison Control)...........[phone]  PoisonHelp.org   Free medical advice 24/7 through the Poison Help Line and the online tool      Online Resources:   International Breastfeeding [place] ...............................................................................ibconline.ca   Dr Carl Claire online resource provides videos, articles, and information sheets.     Coeffective...............................................................................................................coeffective.com   Download the free mobile sherman to help get off to a great start with breastfeeding.   Droplet.....................................................................................................................[employer].com   Global  Health Media...........................................................................................LawDeck.org   Videos that teach and empower mothers and caregivers   Infant [facility].............................................................................[phone]      Mediamorph   Provides up to date information for medication use by moms during pregnancy and while breastfeeding.   Echo Augustien....................................................................................................................kellymom.com   Provides online information on breastfeeding and parenting      La Leche League........................................................................................ lllalmsla.org   /   llli.org   Mother-to-mother support groups with education, information support, and encouragement    Work and Pump........................................................................................... Zapstitch.com   Information about breastfeeding for working moms     [place] Resources:   [place] Breastfeeding Coalition............................... 5-567-480-3377    Women's and Children's Hospitaling.[facility]   Find local breastfeeding support   [place] Breastfeeding Support............................................................ LaBreastfeedingSport.org   Zip code search of breastfeeding resources in your area   Partners for Healthy Babies............................................................[phone]   0656916ffsn.org   Connects [place] moms and their families to health and pregnancy resources.  24/7   WIC (Women, Infant, Children)......................................................... [phone]   ldh.la.gov/WIC   Download the [employer] sherman, get code from WIC office    Woman's Hospital Resources:     Baby Cafe............................................................................................................. babycafeusa.org   Free, drop in, informal  breastfeeding support groups offering professional lactation care and intervention.    [facility] [place] [facility]....................................... birthmarkEtelos.com   Infant feeding drop in clinics, Lactation services, support groups, education programs   Cafe [facility]...............................................[phone]   L'Idealist.com/groups/[facility]   Free breastfeeding support group for families of color   Mothers Milk Bank [facility]....................................................[phone]                                                             [facility].Comply365/services/mothers-milk-bank-[facility]   ABHINAV Nesting..................................................................................[phone] nolanesting.com   In person and virtual support for families through pregnancy, birth, and early parenthood.       [facility] [place]- Dr. Hannah Bolden.......................[phone]   [address] [place] [address]                                  [email or web address]   [email or web address]   [facility] Lactation Care, [facility] (Corinne Wasserman RN, IBCLC) ............................[phone] R People [email or web address]    Healthy Start [place].....................................[phone] ([place])  [phone] (Vishal)   [facility].gov/health-department/healthy-start   Serves women of childbearing age and addresses issues for pregnant women and their children from birth  to age two.          La Leche League- [place]............................. [employer].Parents R People/ L'Idealist.Parents R People/ CeNeRx BioPharmashelli   In person and virtual mother to mother support groups with education, information support and   encouragement to women who want to breastfeed      [place] Resources:   Breastfeeding Resources- [facility]  Health.....msdh.ms.gov (under womens services)   Find resources and info about planning for breastfeeding, its benefits, and help with breastfeeding  s uccessfully.    Center For Pregnancy Choices- [place]....................................... [employer]   [phone]   [address] [place], [place]. Call or text 24/7   [facility].......................................................Perio Sciencesastbreastfeedingcenter.PurpleBricks   [facility] Lactation Consultants sere [facility], including [facility],   [facility], and surrounding areas.    [place] Breastfeeding Coalition...............................................................................msbfc.org   Promotes and supports breastfeeding with families, health providers, and communities.   [facility] breastfeeding Coalition.....................................................................smbfc.org   Find breastfeeding resources and support groups in your area.    WIC Nutrition Program- [facility] of Health.................................... [place].gov

## 2024-11-26 NOTE — SUBJECTIVE & OBJECTIVE
Interval History: PPD2    She is doing well this morning. She is tolerating a regular diet without nausea or vomiting. She is voiding spontaneously. She is ambulating. She has passed flatus, and has a BM. Vaginal bleeding is mild. She denies fever or chills. Abdominal pain is mild and controlled with oral medications. She Is breastfeeding. She desires circumcision for her male baby: not applicable.    Objective:     Vital Signs (Most Recent):  Temp: 98.7 °F (37.1 °C) (11/26/24 0825)  Pulse: 69 (11/26/24 0825)  Resp: 18 (11/26/24 0825)  BP: 116/84 (11/26/24 0825)  SpO2: 100 % (11/26/24 0825) Vital Signs (24h Range):  Temp:  [97.9 °F (36.6 °C)-98.7 °F (37.1 °C)] 98.7 °F (37.1 °C)  Pulse:  [69-72] 69  Resp:  [16-18] 18  SpO2:  [99 %-100 %] 100 %  BP: (116-132)/(72-84) 116/84        There is no height or weight on file to calculate BMI.      Intake/Output Summary (Last 24 hours) at 11/26/2024 1018  Last data filed at 11/25/2024 1200  Gross per 24 hour   Intake --   Output 300 ml   Net -300 ml         Significant Labs:  Lab Results   Component Value Date    GROUPTRH A POS 11/24/2024    HEPBSAG Non-reactive 05/29/2024    STREPBCULT No Group B Streptococcus isolated 11/04/2024     Recent Labs   Lab 11/25/24  0542   HGB 10.0*   HCT 31.6*       I have personallly reviewed all pertinent lab results from the last 24 hours.    Physical Exam:   Constitutional: She is oriented to person, place, and time. She appears well-developed and well-nourished.    HENT:   Head: Normocephalic and atraumatic.    Eyes: Conjunctivae are normal.     Cardiovascular:  Normal rate.             Pulmonary/Chest: Effort normal.        Abdominal: Soft. There is no abdominal tenderness.     Genitourinary: There is vaginal discharge (lochia) in the vagina.           Musculoskeletal: Normal range of motion.       Neurological: She is alert and oriented to person, place, and time.    Skin: Skin is warm and dry.    Psychiatric: She has a normal mood and  affect. Her behavior is normal. Judgment and thought content normal.       Review of Systems   Constitutional:  Negative for chills and fever.   Eyes: Negative.  Negative for visual disturbance.   Respiratory: Negative.     Cardiovascular: Negative.    Gastrointestinal:  Positive for abdominal pain (cramping). Negative for nausea and vomiting.   Endocrine: Negative.    Genitourinary:  Positive for vaginal bleeding (lochia). Negative for vaginal odor.   Musculoskeletal: Negative.    Integumentary:  Negative.   Neurological: Negative.  Negative for headaches.   Hematological: Negative.    Psychiatric/Behavioral: Negative.     Breast: negative.

## 2024-11-26 NOTE — LACTATION NOTE
11/26/24 1030   Maternal Infant Feeding   Maternal Emotional State independent;relaxed   Latch Assistance no   Equipment Type   Breast Pump Type double electric, personal  (Spectra pump at home)   Community Referrals   Community Referrals support group;pediatric care provider;outpatient lactation program  (Community resources given)     0910- LC to room. Mom and baby both asleep. Told family member to call LC when they are awake for latch assessment and discharge education.     1030- LC called to room. Baby recently fed, asleep now. LC did discharge lactation teaching and reviewed the Mother's Breastfeeding Guide and reviewed the breastfeeding community resources given. LC answered all questions. Mother has  phone number  for questions after DC. Call for latch on check at next feeding.    Alert and oriented, no focal deficits, no motor or sensory deficits.

## 2024-11-26 NOTE — PLAN OF CARE
VSS. Pain controlled with scheduled oral pain medication. Breastfeeding. Fundus firm and midline with light to moderate lochia rubra. Voiding spontaneously with adequate output. Passing gas. Discharge orders in per CNM. Discharge instructions an s/s of when to contact provider/return to [place] reviewed, understanding verbalized. Pt to follow up in 2 weeks for mood check & in 6 weeks for PP visit. No concerns at this time.

## 2024-11-27 ENCOUNTER — PATIENT MESSAGE (OUTPATIENT)
Dept: OBSTETRICS AND GYNECOLOGY | Facility: OTHER | Age: 19
End: 2024-11-27
Payer: MEDICAID

## 2024-12-03 ENCOUNTER — OFFICE VISIT (OUTPATIENT)
Dept: OBSTETRICS AND GYNECOLOGY | Facility: CLINIC | Age: 19
End: 2024-12-03
Payer: MEDICAID

## 2024-12-03 PROCEDURE — 99212 OFFICE O/P EST SF 10 MIN: CPT | Mod: 95,,,

## 2024-12-03 NOTE — PROGRESS NOTES
The patient location is: [facility]  The chief complaint leading to consultation is: Mood check  Visit type: audiovisual  Total time spent with patient: 10 min    Each patient to whom he or she provides medical services by telemedicine is:  (1) informed of the relationship between the physician and patient and the respective role of any other health care provider with respect to management of the patient; and (2) notified that he or she may decline to receive medical services by telemedicine and may withdraw from such care at any time.    Notes:     Chief Complaint: PP Mood check     HPI:      Brenda Menendez is a 19 y.o.  who presents today for PP mood check.  S/p  on 2024 without complications. Reports so far doing well overall.  Endorses some tearfulness for the first couple of days but feels better now.  On Zoloft.  Having some anxiety. Took baby to emergency room last night because thought she had congestation and fever but was fine.  Reports bonding with her baby appropriately. Denies harmful thoughts towards baby. She is able to experience pleasure and does not have trouble managing daily tasks. Denies suicidal and homicidal thoughts. Denies difficulty sleeping.  Having some breastfeeding frustration.  Hurting with laching.  No open sores or cracking. No bleeding.  Eating well.  Mom and sisters home for support.    ROS:     GENERAL: Denies fevers or chills. Feeling well overall.   ABDOMEN: Denies abdominal pain, constipation, diarrhea, nausea, vomiting, change in appetite.   URINARY: Denies frequency, dysuria, hematuria.  GYNECOLOGIC: See HPI.  NEUROLOGIC: Denies syncope or weakness.     Physical Exam:     APPEARANCE: Well nourished, well developed, in no acute distress.  RESP: No respiratory distress appreciated.   The rest of the exam was deferred due to televisit.    Assessment/Plan:     Postpartum care and examination    Doing well overall.  Discussed PP progression and s/s of PPD.    Reviewed breast feeding techniques and creams.  Strict instructions given to reach out if needs to be seen.    Follow up for scheduled PP visit at 6 weeks.    Kadi Polk (Maggie), ANJELICA  Obstetrics and Gynecology  [facility]

## 2024-12-16 RX ORDER — FENTANYL/BUPIVACAINE/NS/PF 2MCG/ML-.1
PLASTIC BAG, INJECTION (ML) INJECTION CONTINUOUS PRN
Status: DISCONTINUED | OUTPATIENT
Start: 2024-11-24 | End: 2024-12-16

## 2024-12-16 RX ORDER — LIDOCAINE HYDROCHLORIDE AND EPINEPHRINE 15; 5 MG/ML; UG/ML
INJECTION, SOLUTION EPIDURAL
Status: DISCONTINUED | OUTPATIENT
Start: 2024-11-24 | End: 2024-12-16

## 2025-01-02 ENCOUNTER — PATIENT MESSAGE (OUTPATIENT)
Dept: OBSTETRICS AND GYNECOLOGY | Facility: CLINIC | Age: 20
End: 2025-01-02
Payer: MEDICAID

## 2025-01-06 ENCOUNTER — PATIENT MESSAGE (OUTPATIENT)
Dept: OBSTETRICS AND GYNECOLOGY | Facility: CLINIC | Age: 20
End: 2025-01-06
Payer: MEDICAID

## 2025-01-06 ENCOUNTER — POSTPARTUM VISIT (OUTPATIENT)
Dept: OBSTETRICS AND GYNECOLOGY | Facility: CLINIC | Age: 20
End: 2025-01-06
Payer: MEDICAID

## 2025-01-06 VITALS
HEIGHT: 68 IN | SYSTOLIC BLOOD PRESSURE: 120 MMHG | WEIGHT: 173.75 LBS | DIASTOLIC BLOOD PRESSURE: 70 MMHG | BODY MASS INDEX: 26.33 KG/M2

## 2025-01-06 DIAGNOSIS — Z30.017 NEXPLANON INSERTION: ICD-10-CM

## 2025-01-06 PROCEDURE — 99213 OFFICE O/P EST LOW 20 MIN: CPT | Mod: PBBFAC | Performed by: STUDENT IN AN ORGANIZED HEALTH CARE EDUCATION/TRAINING PROGRAM

## 2025-01-06 PROCEDURE — 0503F POSTPARTUM CARE VISIT: CPT | Mod: CPTII,,, | Performed by: STUDENT IN AN ORGANIZED HEALTH CARE EDUCATION/TRAINING PROGRAM

## 2025-01-06 PROCEDURE — 99999 PR PBB SHADOW E&M-EST. PATIENT-LVL III: CPT | Mod: PBBFAC,,, | Performed by: STUDENT IN AN ORGANIZED HEALTH CARE EDUCATION/TRAINING PROGRAM

## 2025-01-06 RX ORDER — CLOTRIMAZOLE 1 %
CREAM (GRAM) TOPICAL 2 TIMES DAILY
Qty: 12 G | Refills: 0 | Status: SHIPPED | OUTPATIENT
Start: 2025-01-06

## 2025-01-06 NOTE — PROGRESS NOTES
"Subjective:     Brenda Menendez is a 19 y.o.  who presents for a 6 week post-partum follow up after a . Today she denies nausea or vomiting. Pain has been well controlled. Denies pus or drainage from vagina. Breast Feeding. Endorsing persistent itch/irritation at nipple. Thinks baby has thrush - has apt tomorrow. Reports baby is doing well overall. Denies symptoms of postpartum depression. Overall doing well.      Review the Delivery Report for details.     Objective:     Vitals:  /70   Ht 5' 8" (1.727 m)   Wt 78.8 kg (173 lb 11.6 oz)   LMP 2024   Breastfeeding Yes   BMI 26.41 kg/m²     General:   Alert and cooperative   Abdomen:  Abdomen is soft, non distended, non-tender.    Breasts:   No rash or redness, no excoriations, nontender, no warmth.   Pelvic:  Clean, healing well. No acute inflammatory exudate, no evidence of infection.     Skin:  Warm and dry, bilateral trace edema.      Assessment:     Postpartum Care  - S/p  -  6 weeks postpartum   on 2024   - PP anxiety  Zoloft compliant  Mood much better than before!  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 Continue daily wound care.  2. Pelvic rest for 6 weeks postpartum.    3. Gradually resume normal activities.  4. Return to clinic for nexplanon insertion.      "

## 2025-01-06 NOTE — LETTER
January 6, 2025      Isma  [address], [address]  [facility] [address]  Phone: [phone]  Fax: [phone]       Patient: Brenda Menendez   YOB: 2005  Date of Visit: 01/06/2025    To Whom It May Concern:    Joanna Menendez  was at [facility] on 01/06/2025. The patient may return to work with out  any  restrictions. If you have any questions or concerns, or if I can be of further assistance, please do not hesitate to contact me.    Sincerely,    Carmen Duenas Md

## 2025-01-24 ENCOUNTER — PROCEDURE VISIT (OUTPATIENT)
Dept: OBSTETRICS AND GYNECOLOGY | Facility: CLINIC | Age: 20
End: 2025-01-24
Payer: MEDICAID

## 2025-01-24 VITALS
SYSTOLIC BLOOD PRESSURE: 110 MMHG | HEIGHT: 68 IN | WEIGHT: 172.81 LBS | BODY MASS INDEX: 26.19 KG/M2 | DIASTOLIC BLOOD PRESSURE: 70 MMHG

## 2025-01-24 DIAGNOSIS — Z30.017 NEXPLANON INSERTION: Primary | ICD-10-CM

## 2025-01-24 DIAGNOSIS — Z01.812 PRE-PROCEDURE LAB EXAM: ICD-10-CM

## 2025-01-24 LAB
B-HCG UR QL: NEGATIVE
CTP QC/QA: YES

## 2025-01-24 PROCEDURE — 81025 URINE PREGNANCY TEST: CPT | Mod: PBBFAC | Performed by: STUDENT IN AN ORGANIZED HEALTH CARE EDUCATION/TRAINING PROGRAM

## 2025-01-24 PROCEDURE — 11981 INSERTION DRUG DLVR IMPLANT: CPT | Mod: PBBFAC | Performed by: STUDENT IN AN ORGANIZED HEALTH CARE EDUCATION/TRAINING PROGRAM

## 2025-01-24 PROCEDURE — 99999PBSHW PR PBB SHADOW TECHNICAL ONLY FILED TO HB: Mod: PBBFAC,,,

## 2025-01-24 PROCEDURE — 99999PBSHW POCT URINE PREGNANCY: Mod: PBBFAC,,,

## 2025-01-24 PROCEDURE — 99499 UNLISTED E&M SERVICE: CPT | Mod: S$PBB,,, | Performed by: STUDENT IN AN ORGANIZED HEALTH CARE EDUCATION/TRAINING PROGRAM

## 2025-01-24 RX ADMIN — ETONOGESTREL 68 MG: 68 IMPLANT SUBCUTANEOUS at 10:01

## 2025-01-24 NOTE — PROCEDURES
Insertion of Nexplanon    Date/Time: 1/24/2025 10:30 AM    Performed by: Carmen Duenas MD  Authorized by: Carmen Duenas MD    Consent:   Consent obtained:  Prior to procedure the appropriate consent was completed and verified  Consent given by:  Patient  Patient questions answered: yes    Patient agrees, verbalizes understanding, and wants to proceed: yes    Educational handouts given: yes    Instructions and paperwork completed: yes    Pre-Procedure:   Pre-procedure timeout performed: yes    Local anesthetic:  Lidocaine without epinephrine  The site was cleaned and prepped in a sterile fashion: yes    Insertion Procedure:   Left/right:  left arm   68 mg etonogestreL 68 mg  Visualization of implant was obtained: yes    Nexplanon was inserted and trocar removed: yes    Visualization of notch in stilette and palpitation of device: yes    Palpation confirms placement by provider and patient: yes    Site was closed with steri-strips and pressure bandage applied: yes         Nexplanon Insertion Procedure Note:    Procedure explained to patient including risks/benefits/alternatives and informed consent obtained.  The patient was placed in supine position with left arm flexed at the elbow and externally rotated.  A time out was performed. The insertion site was preped using iodine betadine lollipops approximately 8-10 cm above elbow crease at the inner side of the upper arm, 4 cm beneath the biceps muscles groove.  2 ml of lidocaine 1% local anesthesia injected along the intended site of insertion. Alcohol prep pad was used to sterilize the area. The trochar was then inserted at a 30 degree angle to morris the skin, then lowered to horizontal plane. The needle was then inserted its full length and the implant placed.  The insertion site was then palpated to assure placement of Nexplanon.  A Bandaid was placed over incision. Pressure dressing was then placed.  The patient tolerated the procedure well.     The  patient was instructed to leave the pressure dressing on for 24 hours and to keep the area dry. Remove Bandaid at 72 hours. The patient is to observe the site for bleeding, discharge, redness or swelling, and to call provider if any of these occur. To let me know if any issues. Reviewed anticipated course.    Lot No.: [id number]  Exp:  10/1/26

## 2025-03-05 ENCOUNTER — PATIENT MESSAGE (OUTPATIENT)
Dept: OBSTETRICS AND GYNECOLOGY | Facility: CLINIC | Age: 20
End: 2025-03-05
Payer: MEDICAID

## 2025-06-05 ENCOUNTER — PATIENT MESSAGE (OUTPATIENT)
Dept: OBSTETRICS AND GYNECOLOGY | Facility: CLINIC | Age: 20
End: 2025-06-05
Payer: COMMERCIAL

## 2025-06-06 ENCOUNTER — PATIENT MESSAGE (OUTPATIENT)
Dept: URGENT CARE | Facility: CLINIC | Age: 20
End: 2025-06-06

## 2025-06-06 ENCOUNTER — NURSE TRIAGE (OUTPATIENT)
Dept: ADMINISTRATIVE | Facility: CLINIC | Age: 20
End: 2025-06-06
Payer: COMMERCIAL

## 2025-06-09 ENCOUNTER — TELEPHONE (OUTPATIENT)
Dept: OBSTETRICS AND GYNECOLOGY | Facility: CLINIC | Age: 20
End: 2025-06-09
Payer: COMMERCIAL

## 2025-06-09 NOTE — TELEPHONE ENCOUNTER
Carmen Duenas MD to Me  (Selected Message)        6/9/25  8:31 AM  Let's have patient come in for an in person visit so we can take a look at her nexplanon site! Anything this week? With me or NP!

## 2025-06-10 ENCOUNTER — OFFICE VISIT (OUTPATIENT)
Dept: OBSTETRICS AND GYNECOLOGY | Facility: CLINIC | Age: 20
End: 2025-06-10
Payer: COMMERCIAL

## 2025-06-10 VITALS — WEIGHT: 180.63 LBS | HEIGHT: 68 IN | BODY MASS INDEX: 27.37 KG/M2

## 2025-06-10 DIAGNOSIS — Z51.89 VISIT FOR WOUND CHECK: Primary | ICD-10-CM

## 2025-06-10 PROCEDURE — 99213 OFFICE O/P EST LOW 20 MIN: CPT | Mod: S$GLB,,, | Performed by: STUDENT IN AN ORGANIZED HEALTH CARE EDUCATION/TRAINING PROGRAM

## 2025-06-10 PROCEDURE — 1159F MED LIST DOCD IN RCRD: CPT | Mod: CPTII,S$GLB,, | Performed by: STUDENT IN AN ORGANIZED HEALTH CARE EDUCATION/TRAINING PROGRAM

## 2025-06-10 PROCEDURE — 3008F BODY MASS INDEX DOCD: CPT | Mod: CPTII,S$GLB,, | Performed by: STUDENT IN AN ORGANIZED HEALTH CARE EDUCATION/TRAINING PROGRAM

## 2025-06-10 PROCEDURE — 99999 PR PBB SHADOW E&M-EST. PATIENT-LVL III: CPT | Mod: PBBFAC,,, | Performed by: STUDENT IN AN ORGANIZED HEALTH CARE EDUCATION/TRAINING PROGRAM

## 2025-06-10 NOTE — PROGRESS NOTES
"OBSTETRICS AND GYNECOLOGY    Subjective:      Chief Complaint: Follow-up (Nexplanon reaction)      HPI:  Brenda Menendez is an 19 y.o.  presenting with concerns of nexplanon insertion site. Reports itch at site. Does not notice while busy at work. Has not tried anything to help yet. Not worsening, but not improving.   However, over this past weekend, also developed some sort of ?hive or welt at the site. No new products. Did not put anything on the site. Has image on phone. The raised area self resolved, returned a day later, and then self resolved. Has not returned. No other symptoms. No additional complaints.      OB History    Para Term  AB Living   2 1 1   1   SAB IAB Ectopic Multiple Live Births      0 1      # Outcome Date GA Lbr Jones/2nd Weight Sex Type Anes PTL Lv   2 Term 24 39w2d 12:10 / 00:27 3.47 kg (7 lb 10.4 oz) F Vag-Spont EPI N LUBNA   1               Past Medical History:   Diagnosis Date    Pneumonia     Varicella      Past Surgical History:   Procedure Laterality Date    ESOPHAGUS SURGERY      Repair as an infant     Family History   Problem Relation Name Age of Onset    Breast cancer Maternal Grandmother      Breast cancer Paternal Grandmother      Colon cancer Neg Hx      Ovarian cancer Neg Hx         Allergies:   Review of patient's allergies indicates:   Allergen Reactions    Gluten protein Anxiety, Diarrhea, Itching, Other (See Comments) and Swelling     Abdominal pain          Medications:   Current Medications[1]    Social History     Tobacco Use    Smoking status: Never     Passive exposure: Yes    Smokeless tobacco: Never    Tobacco comments:     dad smokes outside   Substance Use Topics    Alcohol use: No       Objective:   Ht 5' 8" (1.727 m)   Wt 81.9 kg (180 lb 9.6 oz)   Breastfeeding No   BMI 27.46 kg/m²   Physical Exam    GENERAL: Alert, well dressed, well nourished. Appropriate mood and affect. Pleasant.  HEENT: Normocephalic, atraumatic. Extraocular " movements intact. Hearing and vision grossly intact.   PULMONARY: No respiratory distress. No use of accessory muscles of respiration. No cyanosis. Speaking comfortably in full sentences.   CARDIAC: Well perfused.     EXTREMITIES: No visible rashes. No evidence of infection. No redness, no warmth. Palpable implant, does not appear to be too deep or too superficial. No welt or hives. Nontender to palpation. No induration. Well healed small scar from insertion.    Assessment/Plan:     Problem List Items Addressed This Visit    None  Visit Diagnoses         Visit for wound check    -  Primary          Plan to trial antihistamine / benadryl cream for itch - to let me know if insufficient relief    Has images on phone, appears to be an approximately 0.6cm, circular, raised lesion at nexplanon insertion site - now completely resolved  Nexplanon places several months ago, unsure if ?allergic reaction  Discussed options, including removal of nexplanon   At this time, through shared decision making, as no evidence of infection and previous reaction has self-resolved, will assess for pattern development  If hive/welt continues to return, will need to consider removal and other modalities for contraception          As of April 1, 2021, the Cures Act has been passed nationally. This new law requires that all doctors progress notes, lab results, pathology reports and radiology reports be released IMMEDIATELY to the patient in the patient portal. That means that the results are released to you at the EXACT same time they are released to me. Therefore, with all of the patients that I have I am not able to reply to each patient exactly when the results come in. So there will be a delay from when you see the results to when I see them and have time to come up with a response to send you. Also I only see these results when I am on the computer at work. So if the results come in over the weekend or after 5 pm of a work day, I will not  see them until the next business day. 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 So please be patient!   Thanks for your understanding and patience.         [1]   Current Outpatient Medications   Medication Sig Dispense Refill    clotrimazole (LOTRIMIN) 1 % cream Apply topically 2 (two) times daily. Apply to nipple 3 times a day. (Patient not taking: Reported on 6/10/2025) 12 g 0    ibuprofen (ADVIL,MOTRIN) 600 MG tablet Take 1 tablet (600 mg total) by mouth every 6 (six) hours as needed for Pain. (Patient not taking: Reported on 6/10/2025) 30 tablet 0    ondansetron (ZOFRAN-ODT) 4 MG TbDL Take 1 tablet (4 mg total) by mouth every 6 (six) hours as needed (Nausea). (Patient not taking: Reported on 6/10/2025) 10 tablet 0    prenatal 87-iron bis-folic-dha 32 mg iron- 1,000 mcg-230mg Cmpk Take 1 tablet by mouth once daily. (Patient not taking: Reported on 6/10/2025)      sertraline (ZOLOFT) 25 MG tablet Take 1 tablet (25 mg total) by mouth once daily. 30 tablet 11     Current Facility-Administered Medications   Medication Dose Route Frequency Provider Last Rate Last Admin    etonogestreL 68 mg intradermal implant 68 mg  68 mg Implant     68 mg at 01/24/25 1034

## 2025-06-23 ENCOUNTER — PATIENT MESSAGE (OUTPATIENT)
Dept: OBSTETRICS AND GYNECOLOGY | Facility: CLINIC | Age: 20
End: 2025-06-23
Payer: COMMERCIAL

## 2025-06-26 ENCOUNTER — OFFICE VISIT (OUTPATIENT)
Dept: OBSTETRICS AND GYNECOLOGY | Facility: CLINIC | Age: 20
End: 2025-06-26
Payer: COMMERCIAL

## 2025-06-26 VITALS
BODY MASS INDEX: 27.03 KG/M2 | DIASTOLIC BLOOD PRESSURE: 82 MMHG | HEIGHT: 68 IN | WEIGHT: 178.38 LBS | SYSTOLIC BLOOD PRESSURE: 120 MMHG

## 2025-06-26 DIAGNOSIS — N94.10 DYSPAREUNIA IN FEMALE: Primary | ICD-10-CM

## 2025-06-26 PROCEDURE — 3079F DIAST BP 80-89 MM HG: CPT | Mod: CPTII,S$GLB,, | Performed by: STUDENT IN AN ORGANIZED HEALTH CARE EDUCATION/TRAINING PROGRAM

## 2025-06-26 PROCEDURE — 99213 OFFICE O/P EST LOW 20 MIN: CPT | Mod: S$GLB,,, | Performed by: STUDENT IN AN ORGANIZED HEALTH CARE EDUCATION/TRAINING PROGRAM

## 2025-06-26 PROCEDURE — 3074F SYST BP LT 130 MM HG: CPT | Mod: CPTII,S$GLB,, | Performed by: STUDENT IN AN ORGANIZED HEALTH CARE EDUCATION/TRAINING PROGRAM

## 2025-06-26 PROCEDURE — 99999 PR PBB SHADOW E&M-EST. PATIENT-LVL III: CPT | Mod: PBBFAC,,, | Performed by: STUDENT IN AN ORGANIZED HEALTH CARE EDUCATION/TRAINING PROGRAM

## 2025-06-26 PROCEDURE — 3008F BODY MASS INDEX DOCD: CPT | Mod: CPTII,S$GLB,, | Performed by: STUDENT IN AN ORGANIZED HEALTH CARE EDUCATION/TRAINING PROGRAM

## 2025-06-26 PROCEDURE — 1159F MED LIST DOCD IN RCRD: CPT | Mod: CPTII,S$GLB,, | Performed by: STUDENT IN AN ORGANIZED HEALTH CARE EDUCATION/TRAINING PROGRAM
